# Patient Record
Sex: MALE | Race: BLACK OR AFRICAN AMERICAN | NOT HISPANIC OR LATINO | Employment: FULL TIME | URBAN - METROPOLITAN AREA
[De-identification: names, ages, dates, MRNs, and addresses within clinical notes are randomized per-mention and may not be internally consistent; named-entity substitution may affect disease eponyms.]

---

## 2017-08-28 DIAGNOSIS — M54.2 NECK PAIN: Primary | ICD-10-CM

## 2017-08-28 DIAGNOSIS — M54.9 PAIN IN BACK: ICD-10-CM

## 2017-09-05 ENCOUNTER — CLINICAL SUPPORT (OUTPATIENT)
Dept: REHABILITATION | Facility: HOSPITAL | Age: 37
End: 2017-09-05
Attending: INTERNAL MEDICINE
Payer: COMMERCIAL

## 2017-09-05 DIAGNOSIS — Z74.09 DECREASED STRENGTH, ENDURANCE, AND MOBILITY: ICD-10-CM

## 2017-09-05 DIAGNOSIS — R53.1 DECREASED STRENGTH, ENDURANCE, AND MOBILITY: ICD-10-CM

## 2017-09-05 DIAGNOSIS — R68.89 DECREASED STRENGTH, ENDURANCE, AND MOBILITY: ICD-10-CM

## 2017-09-05 PROCEDURE — G8978 MOBILITY CURRENT STATUS: HCPCS | Mod: CK,PO

## 2017-09-05 PROCEDURE — 97161 PT EVAL LOW COMPLEX 20 MIN: CPT | Mod: PO

## 2017-09-05 PROCEDURE — G8979 MOBILITY GOAL STATUS: HCPCS | Mod: CI,PO

## 2017-09-05 NOTE — PROGRESS NOTES
Physical Therapy Initial Evaluation     Name: Denny Sullivan  [facility] Number: [id number]    Diagnosis:   Encounter Diagnosis   Name Primary?    Decreased strength, endurance, and mobility      Physician: Rosalinda Rodriguez MD  Treatment Orders: PT Eval and Treat  Past Medical History:   Diagnosis Date    Hypertension     Metabolic syndrome     Pre-Diabetic    Migraine headache     Seizure      Current Outpatient Prescriptions   Medication Sig    amlodipine (NORVASC) 10 MG tablet Take 1 tablet (10 mg total) by mouth once daily.    carvedilol (COREG) 12.5 MG tablet Take 1 tablet (12.5 mg total) by mouth 2 (two) times daily.    dicyclomine (BENTYL) 20 mg tablet Take 20 mg by mouth every 6 (six) hours.    liraglutide 0.6 mg/0.1 mL (18 mg/3 mL) PnIj Inject 1.8 mg into the skin before breakfast.    metformin (GLUCOPHAGE) 500 MG tablet     omeprazole (PRILOSEC) 40 MG capsule Take 1 capsule (40 mg total) by mouth once daily.    rizatriptan (MAXALT) 10 MG tablet Take 10 mg by mouth as needed.    rosuvastatin (CRESTOR) 10 MG tablet Take 10 mg by mouth once daily.    topiramate (TOPAMAX) 50 MG tablet Take 50 mg by mouth 2 (two) times daily.     No current facility-administered medications for this visit.      Review of patient's allergies indicates:   Allergen Reactions    Toradol [ketorolac] Other (See Comments)     AIN-severe with ARF    Nsaids (non-steroidal anti-inflammatory drug) Other (See Comments)     AIN-severe       Evaluation Date: 9/5/17  Visit # authorized: 20  Authorization period:   Plan of care Expiration: 12/31/17  MD referral: y      Subjective     Patient states:  Reports that he began having pain for a little over a month and reports no significant change in activity prior to the onset of pain.  The pt denies any hx of neck or back pain. The pt reports he did have an xray that showed disc herniation and arthritic changes in his cervical  spine. The pt reports he has had shooting pain in between his shoulder blades for quite some time but did not connect it to his neck.  The recent pain has been throbbing in nature and he does have periods where he gets relief.  The pt reports that he has been feeling a little better as of late and notes he has not had to take his medication as often.  The pt reports that he is allergic to NSAIDS.  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  The pt reports that he does get headaches but he does not feel they are related to the neck pain as they begin in his head as opposed to tension in his neck.  Sleeping in the wrong position increases his pain.  He typically sleeps on his side or stomach but has been having a lot of pain recently with sleeping on his stomach (throbbing pain).  The pt reports that the only thing that decreases his pain is the medication (muscle relaxer and he thinks a pain reliever). The pt's wife had their first baby 6 weeks ago and the pt reports that the baby does not sleep well.  She wakes every 2-3 hours to nurse or take a bottle and he has been getting up to help feed her.    Pain Scale: Denny rates pain on a scale of 0-10 to be 8 at worst; 3 currently; 1 at best .  Onset: insidious  Radicular symptoms:  Shooting in between shoulder blades   Aggravating factors:   Sleeping on his stomach   Easing factors:  Medication   Prior Therapy: none   Functional Deficits Leading to Referral: pain and decreased ability to perform work duties   Prior functional status: Independent without pain   DME owned/used:   Occupation:  Banker                        Pts goals:  To reduce pain     Objective     Posture Alignment: slouched posture    CERVICAL SPINE AROM:   Flexion: 35   Extension: 37   Left Sidebend: 20   Right Sidebend: 15    Left Rotation: 35   Right Rotation: 33     SEGMENTAL MOBILITY: hypomobile mid cervical, subcranial and mid  thoracic spine      UPPER EXTREMITY STRENGTH:   Left Right   Shoulder Flexion 4/5 4/5   Shoulder Abduction 4/5 4/5     Shoulder Internal Rotation 4/5 4/5   Shoulder External Rotation 4/5 4/5   Rhomboids  3/5 3/5   Low Traps  3/5 3/5   Mid Traps  3/5 3/5       DTR's:   Left Right   Biceps Tendon 2+ 2+   Brachioradialis 2+ 2+   Triceps Tendon 2+ 2+     Dermatomes: Sensation: Light Touch: Intact  Myotomes: WNL  Palpation: WNL     Special Tests:All Negative      Compression   Dystraction   Spurling   Alar Ligament   Transverse Ligament   Travis Fracture     Pt/family was provided educational information, including: role of PT, goals for PT, scheduling - pt verbalized understanding. Discussed insurance limitations with pt.     TREATMENT     Time In: 3:30 pm   Time Out: 4:30 pm     PT Evaluation Completed? Yes  Discussed Plan of Care with patient: Yes    Written Home Exercises Provided: deep neck flexor isometrics in supine, scapular retraction, upper trap stretch B, serratus punch in supne   Denny demo good understanding of the education provided. Patient demo good return demo of skill of exercises.    Assessment     Patient presents today with decreased AROM, decreased spinal mobility, decreased postural endurance and awareness, pain and decreased function.    Pt prognosis is Good.  Pt will benefit from skilled outpatient physical therapy to address the above stated deficits, provide pt/family education and to maximize pt's level of independence.     History  Co-morbidities and personal factors that may impact the plan of care Examination  Body Structures and Functions, activity limitations and participation restrictions that may impact the plan of care    Clinical Presentation   Co-morbidities:   high BMI, hypertension, acute renal failure, metabolic syndrome         Personal Factors:   lifestyle Body Regions:   neck  back  upper extremities    Body Systems:   ROM  strength  joint mobility        Participation  Restrictions:   Limits the patient with work responsibilities      Activity limitations:   Learning and applying knowledge  no deficits    General Tasks and Commands  no deficits    Communication  no deficits    Mobility  lifting and carrying objects    Self care  sleeping     Domestic Life  no deficits    Interactions/Relationships  no deficits    Life Areas  higher education    Community and Social Life  no deficits         stable and uncomplicated                      low   moderate  moderate Decision Making/ Complexity Score:  low           Pt's spiritual, cultural and educational needs considered and pt agreeable to plan of care and goals as stated below:     Anticipated Barriers for physical therapy: none     Short Term GOALS: 4 weeks. Pt agrees with goals set.  1. Patient demonstrates independence with HEP.   2. Patient demonstrates independence with Postural Awareness.   3. Patient demonstrates independence with body mechanics.     Long Term GOALS: 8 weeks. Pt agrees with goals set.  1. Patient demonstrates increased ROM and spinal mobility  to improve tolerance to functional activities pain free.   2. Patient demonstrates increased strength BUE's to 4+/5 or greater to improve tolerance to functional activities pain free.   3. Patient demonstrates improved overall function per FOTO Neck Survey to 15% Limitation or less.     Functional Limitations Reports - G Codes  Category: Mobility  Tool: FOTO Neck Survey  Score: 45% Limitation   Modifier  Impairment Limitation Restriction    CH  0 % impaired, limited or restricted    CI  @ least 1% but less than 20% impaired, limited or restricted    CJ  @ least 20%<40% impaired, limited or restricted    CK  @ least 40%<60% impaired, limited or restricted    CL  @ least 60% <80% impaired, limited or restricted    CM  @ least 80%<100% impaired limited or restricted    CN  100% impaired, limited or restricted     Current/ 45% limitation  Goal/ : 15%  limitation    PLAN     Outpatient physical therapy 1-2 times weekly to include: pt ed, hep, therapeutic exercises, neuromuscular re-education/ balance exercises, joint mobilizations, aquatic therapy and modalities prn. Cont PT for  8 weeks. Pt may be seen by PTA as part of the rehabilitation team.     Therapist: Radha Ellis, PT  9/5/2017

## 2017-09-06 ENCOUNTER — HOSPITAL ENCOUNTER (EMERGENCY)
Facility: HOSPITAL | Age: 37
Discharge: HOME | End: 2017-09-06
Attending: EMERGENCY MEDICINE
Payer: COMMERCIAL

## 2017-09-06 VITALS
RESPIRATION RATE: 18 BRPM | TEMPERATURE: 98 F | HEART RATE: 82 BPM | BODY MASS INDEX: 42.66 KG/M2 | HEIGHT: 72 IN | SYSTOLIC BLOOD PRESSURE: 163 MMHG | DIASTOLIC BLOOD PRESSURE: 110 MMHG | OXYGEN SATURATION: 97 % | WEIGHT: 315 LBS

## 2017-09-06 DIAGNOSIS — K64.4 EXTERNAL HEMORRHOID, BLEEDING: Primary | ICD-10-CM

## 2017-09-06 PROCEDURE — 99283 EMERGENCY DEPT VISIT LOW MDM: CPT

## 2017-09-06 PROCEDURE — 99284 EMERGENCY DEPT VISIT MOD MDM: CPT | Mod: ,,, | Performed by: PHYSICIAN ASSISTANT

## 2017-09-06 RX ORDER — CYCLOBENZAPRINE HCL 10 MG
10 TABLET ORAL 3 TIMES DAILY PRN
COMMUNITY
End: 2017-11-15

## 2017-09-06 RX ORDER — LEVOCETIRIZINE DIHYDROCHLORIDE 5 MG/1
5 TABLET, FILM COATED ORAL NIGHTLY
COMMUNITY
End: 2018-02-15 | Stop reason: SDUPTHER

## 2017-09-06 RX ORDER — HYDROCORTISONE ACETATE PRAMOXINE HCL 2.5; 1 G/100G; G/100G
CREAM TOPICAL 3 TIMES DAILY
Qty: 1 TUBE | Refills: 0 | Status: SHIPPED | OUTPATIENT
Start: 2017-09-06 | End: 2017-12-11 | Stop reason: ALTCHOICE

## 2017-09-06 NOTE — PLAN OF CARE
Physical Therapy Initial Evaluation     Name: Denny Sullivan  [facility] Number: [id number]    Diagnosis:   Encounter Diagnosis   Name Primary?    Decreased strength, endurance, and mobility      Physician: Rosalinda Rodriguez MD  Treatment Orders: PT Eval and Treat  Past Medical History:   Diagnosis Date    Hypertension     Metabolic syndrome     Pre-Diabetic    Migraine headache     Seizure      Current Outpatient Prescriptions   Medication Sig    amlodipine (NORVASC) 10 MG tablet Take 1 tablet (10 mg total) by mouth once daily.    carvedilol (COREG) 12.5 MG tablet Take 1 tablet (12.5 mg total) by mouth 2 (two) times daily.    dicyclomine (BENTYL) 20 mg tablet Take 20 mg by mouth every 6 (six) hours.    liraglutide 0.6 mg/0.1 mL (18 mg/3 mL) PnIj Inject 1.8 mg into the skin before breakfast.    metformin (GLUCOPHAGE) 500 MG tablet     omeprazole (PRILOSEC) 40 MG capsule Take 1 capsule (40 mg total) by mouth once daily.    rizatriptan (MAXALT) 10 MG tablet Take 10 mg by mouth as needed.    rosuvastatin (CRESTOR) 10 MG tablet Take 10 mg by mouth once daily.    topiramate (TOPAMAX) 50 MG tablet Take 50 mg by mouth 2 (two) times daily.     No current facility-administered medications for this visit.      Review of patient's allergies indicates:   Allergen Reactions    Toradol [ketorolac] Other (See Comments)     AIN-severe with ARF    Nsaids (non-steroidal anti-inflammatory drug) Other (See Comments)     AIN-severe       Evaluation Date: 9/5/17  Visit # authorized: 20  Authorization period:   Plan of care Expiration: 12/31/17  MD referral: y      Subjective     Patient states:  Reports that he began having pain for a little over a month and reports no significant change in activity prior to the onset of pain.  The pt denies any hx of neck or back pain. The pt reports he did have an xray that showed disc herniation and arthritic changes in his cervical spine.  The pt reports he has had shooting pain in between his shoulder blades for quite some time but did not connect it to his neck.  The recent pain has been throbbing in nature and he does have periods where he gets relief.  The pt reports that he has been feeling a little better as of late and notes he has not had to take his medication as often.  The pt reports that he is allergic to NSAIDS.  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  The pt reports that he does get headaches but he does not feel they are related to the neck pain as they begin in his head as opposed to tension in his neck.  Sleeping in the wrong position increases his pain.  He typically sleeps on his side or stomach but has been having a lot of pain recently with sleeping on his stomach (throbbing pain).  The pt reports that the only thing that decreases his pain is the medication (muscle relaxer and he thinks a pain reliever). The pt's wife had their first baby 6 weeks ago and the pt reports that the baby does not sleep well.  She wakes every 2-3 hours to nurse or take a bottle and he has been getting up to help feed her.    Pain Scale: Denny rates pain on a scale of 0-10 to be 8 at worst; 3 currently; 1 at best .  Onset: insidious  Radicular symptoms:  Shooting in between shoulder blades   Aggravating factors:   Sleeping on his stomach   Easing factors:  Medication   Prior Therapy: none   Functional Deficits Leading to Referral: pain and decreased ability to perform work duties   Prior functional status: Independent without pain   DME owned/used:   Occupation:  Banker                        Pts goals:  To reduce pain     Objective     Posture Alignment: slouched posture    CERVICAL SPINE AROM:   Flexion: 35   Extension: 37   Left Sidebend: 20   Right Sidebend: 15    Left Rotation: 35   Right Rotation: 33     SEGMENTAL MOBILITY: hypomobile mid cervical, subcranial and mid thoracic  spine      UPPER EXTREMITY STRENGTH:   Left Right   Shoulder Flexion 4/5 4/5   Shoulder Abduction 4/5 4/5     Shoulder Internal Rotation 4/5 4/5   Shoulder External Rotation 4/5 4/5   Rhomboids  3/5 3/5   Low Traps  3/5 3/5   Mid Traps  3/5 3/5       DTR's:   Left Right   Biceps Tendon 2+ 2+   Brachioradialis 2+ 2+   Triceps Tendon 2+ 2+     Dermatomes: Sensation: Light Touch: Intact  Myotomes: WNL  Palpation: WNL     Special Tests:All Negative      Compression   Dystraction   Spurling   Alar Ligament   Transverse Ligament   Travis Fracture     Pt/family was provided educational information, including: role of PT, goals for PT, scheduling - pt verbalized understanding. Discussed insurance limitations with pt.     TREATMENT     Time In: 3:30 pm   Time Out: 4:30 pm     PT Evaluation Completed? Yes  Discussed Plan of Care with patient: Yes    Written Home Exercises Provided: deep neck flexor isometrics in supine, scapular retraction, upper trap stretch B, serratus punch in supne   Denny demo good understanding of the education provided. Patient demo good return demo of skill of exercises.    Assessment     Patient presents today with decreased AROM, decreased spinal mobility, decreased postural endurance and awareness, pain and decreased function.    Pt prognosis is Good.  Pt will benefit from skilled outpatient physical therapy to address the above stated deficits, provide pt/family education and to maximize pt's level of independence.     History  Co-morbidities and personal factors that may impact the plan of care Examination  Body Structures and Functions, activity limitations and participation restrictions that may impact the plan of care    Clinical Presentation   Co-morbidities:   high BMI, hypertension, acute renal failure, metabolic syndrome         Personal Factors:   lifestyle Body Regions:   neck  back  upper extremities    Body Systems:   ROM  strength  joint mobility        Participation  Restrictions:   Limits the patient with work responsibilities      Activity limitations:   Learning and applying knowledge  no deficits    General Tasks and Commands  no deficits    Communication  no deficits    Mobility  lifting and carrying objects    Self care  sleeping     Domestic Life  no deficits    Interactions/Relationships  no deficits    Life Areas  higher education    Community and Social Life  no deficits         stable and uncomplicated                      low   moderate  moderate Decision Making/ Complexity Score:  low           Pt's spiritual, cultural and educational needs considered and pt agreeable to plan of care and goals as stated below:     Anticipated Barriers for physical therapy: none     Short Term GOALS: 4 weeks. Pt agrees with goals set.  1. Patient demonstrates independence with HEP.   2. Patient demonstrates independence with Postural Awareness.   3. Patient demonstrates independence with body mechanics.     Long Term GOALS: 8 weeks. Pt agrees with goals set.  1. Patient demonstrates increased ROM and spinal mobility  to improve tolerance to functional activities pain free.   2. Patient demonstrates increased strength BUE's to 4+/5 or greater to improve tolerance to functional activities pain free.   3. Patient demonstrates improved overall function per FOTO Neck Survey to 15% Limitation or less.     Functional Limitations Reports - G Codes  Category: Mobility  Tool: FOTO Neck Survey  Score: 45% Limitation   Modifier  Impairment Limitation Restriction    CH  0 % impaired, limited or restricted    CI  @ least 1% but less than 20% impaired, limited or restricted    CJ  @ least 20%<40% impaired, limited or restricted    CK  @ least 40%<60% impaired, limited or restricted    CL  @ least 60% <80% impaired, limited or restricted    CM  @ least 80%<100% impaired limited or restricted    CN  100% impaired, limited or restricted     Current/ 45% limitation  Goal/ : 15%  limitation    PLAN     Outpatient physical therapy 1-2 times weekly to include: pt ed, hep, therapeutic exercises, neuromuscular re-education/ balance exercises, joint mobilizations, aquatic therapy and modalities prn. Cont PT for  8 weeks. Pt may be seen by PTA as part of the rehabilitation team.     Therapist: Radha Ellis, PT  9/5/2017

## 2017-09-07 NOTE — ED PROVIDER NOTES
"Encounter Date: 9/6/2017    SCRIBE #1 NOTE: I, Aly Heath, am scribing for, and in the presence of,  Dr. Méndez. I have scribed the following portions of the note - the APC attestation.       History     Chief Complaint   Patient presents with    Rectal Bleeding     denies pain . Thinks a hemorrhoid popped.  [place] like he could feel a Cyst in rectal area and since bleeding the cyst is no longer rthere.      Patient is a 36-year-old male with a past medical history of HTN and gastritis who presents the ED with rectal leading.  Patient states that 3 PM he noticed blood from his rectum.  He denies any pain.  He has a history of hemorrhoids but has not had any bleeding in "many decades".  Patient states that his hemorrhoid is now "flat" and "not there now" since noticing the bleeding.  His bowel movements are regular and he has not noticed any blood with his stools.  He denies any personal or family history of colon cancer.  He denies any blood thinners or anticoagulant use.  He has no other complaints at this time.          Review of patient's allergies indicates:   Allergen Reactions    Toradol [ketorolac] Other (See Comments)     AIN-severe with ARF    Nsaids (non-steroidal anti-inflammatory drug) Other (See Comments)     AIN-severe     Past Medical History:   Diagnosis Date    Hypertension     Metabolic syndrome     Pre-Diabetic    Migraine headache     Seizure      Past Surgical History:   Procedure Laterality Date    LUNG SURGERY       Family History   Problem Relation Age of Onset    Hypertension Mother     Diabetes Mother     Diabetes Father     Hypertension Father     Asthma Brother      Social History   Substance Use Topics    Smoking status: Never Smoker    Smokeless tobacco: Not on file    Alcohol use Yes      Comment: Occasional wine, beer, and liqor     Review of Systems   Constitutional: Negative for chills and fever.   HENT: Negative for nosebleeds and sore throat.    Eyes: Negative for " visual disturbance.   Respiratory: Negative for shortness of breath.    Cardiovascular: Negative for chest pain.   Gastrointestinal: Positive for anal bleeding. Negative for abdominal pain, constipation, nausea and rectal pain.   Endocrine: Negative for polyuria.   Genitourinary: Negative for dysuria and hematuria.   Musculoskeletal: Negative for back pain.   Skin: Negative for rash.   Neurological: Negative for dizziness, weakness and light-headedness.   Hematological: Does not bruise/bleed easily.       Physical Exam     Initial Vitals [09/06/17 1721]   BP Pulse Resp Temp SpO2   (!) 175/106 89 18 98.4 °F (36.9 °C) 97 %      MAP       129         Physical Exam    Nursing note and vitals reviewed.  Constitutional: He appears well-developed and well-nourished. He is not diaphoretic.   Healthy-appearing obese male in NAD and nontoxic appearing.   HENT:   Head: Normocephalic and atraumatic.   Nose: Nose normal.   Eyes: Conjunctivae and EOM are normal.   Neck: Normal range of motion.   Cardiovascular: Normal rate, regular rhythm and normal heart sounds. Exam reveals no friction rub.    No murmur heard.  Pulmonary/Chest: Breath sounds normal. No respiratory distress. He has no wheezes. He has no rales.   Abdominal: Soft. Bowel sounds are normal. He exhibits no distension. There is no tenderness.   Genitourinary: Rectal exam shows external hemorrhoid (large, bleeding at 12 o'clock). Rectal exam shows no fissure.   Musculoskeletal: Normal range of motion.   Neurological: He is alert and oriented to person, place, and time. He has normal strength. No sensory deficit.   Skin: Skin is warm and dry. No erythema.   Psychiatric: He has a normal mood and affect. Thought content normal.         ED Course   Procedures  Labs Reviewed - No data to display          Medical Decision Making:   History:   Old Medical Records: I decided to obtain old medical records.       APC / Resident Notes:   On exam, hemodynamically stable.  NAD and  nontoxic appearing.  He has a large and bleeding hemorrhoid at 12 o'clock.  Mild tenderness to palpation. I have considered but do not suspect fissure, fistula, abscess, or strangulated hemorrhoid. He only reports bleeding today; I do not suspect that patient is anemic. He denies any other symptoms.    I will treat patient for external hemorrhoid. High fiber diet, stool softener, sitz bath, and analpram cram. F/u with CRS. Patient is comfortable and stable for d/c.         Scribe Attestation:   Scribe #1: I performed the above scribed service and the documentation accurately describes the services I performed. I attest to the accuracy of the note.    Attending Attestation:     Physician Attestation Statement for NP/PA:   I discussed this assessment and plan of this patient with the NP/PA, but I did not personally examine the patient. The face to face encounter was performed by the NP/PA.        Physician Attestation for Scribe:  Physician Attestation Statement for Scribe #1: I, Dr. Méndez, reviewed documentation, as scribed by Aly Heath in my presence, and it is both accurate and complete.                 ED Course      Clinical Impression:   The encounter diagnosis was External hemorrhoid, bleeding.    Disposition:   Disposition: Discharged  Condition: Stable                        Claire Gibbs PA-C  09/08/17 1740

## 2017-09-07 NOTE — ED NOTES
Patient noticed some bleeding from his rectum while at work. Patient thinks it may cyst or hemorrhoids. Has a history of hemmorhoids.  Patient denies blood in stool.

## 2017-09-07 NOTE — DISCHARGE INSTRUCTIONS
Use Analpram cream three times a day.  Do warm sitz bath (warm water with salt) for 20 minutes 3 times a day.  If you are constipated or have hard stools, consider high fiber diet and stool softeners (Colace).  Follow up with colon and rectal services in 3 days or as soon as possible.    Future Appointments  Date Time Provider Department Center   9/12/2017 1:30 PM Radha Ellis, PT [facility]   9/18/2017 3:00 PM [facility]   9/28/2017 3:00 PM [facility]   10/3/2017 2:30 PM Radha Ellis, PT [facility]       Our goal in the emergency department is to always give you outstanding care and exceptional service. You may receive a survey by mail or e-mail in the next week regarding your experience in our ED. We would greatly appreciate your completing and returning the survey. Your feedback provides us with a way to recognize our staff who give very good care and it helps us learn how to improve when your experience was below our aspiration of excellence.

## 2017-09-07 NOTE — ED NOTES
Pt identifiers Denny Sullivan checked and correct  LOC: The patient is awake, alert, aware of environment with an appropriate affect. Oriented x3, speaking appropriately  APPEARANCE: Pt resting comfortably, in no acute distress, pt is clean and well groomed, clothing properly fastened  SKIN: Skin warm, dry and intact, normal skin turgor, moist mucus membranes  RESPIRATORY: Airway is open and patent, respirations are spontaneous, even and unlabored, normal effort and rate  CARDIAC: Normal rate and rhythm, no peripheral edema noted, capillary refill < 3 seconds, bilateral radial pulses 2+  ABDOMEN: Soft, nontender, nondistended. Bowel sounds present. +external hemorrhoid present draining dark red blood.   NEUROLOGIC: PERRL, facial expression is symmetrical, patient moving all extremities spontaneously, normal sensation in all extremities when touched with a finger.  Follows all commands appropriately  MUSCULOSKELETAL: No obvious deformities.

## 2017-09-12 ENCOUNTER — CLINICAL SUPPORT (OUTPATIENT)
Dept: REHABILITATION | Facility: HOSPITAL | Age: 37
End: 2017-09-12
Attending: INTERNAL MEDICINE
Payer: COMMERCIAL

## 2017-09-12 DIAGNOSIS — R68.89 DECREASED STRENGTH, ENDURANCE, AND MOBILITY: ICD-10-CM

## 2017-09-12 DIAGNOSIS — Z74.09 DECREASED STRENGTH, ENDURANCE, AND MOBILITY: ICD-10-CM

## 2017-09-12 DIAGNOSIS — R53.1 DECREASED STRENGTH, ENDURANCE, AND MOBILITY: ICD-10-CM

## 2017-09-12 PROCEDURE — 97110 THERAPEUTIC EXERCISES: CPT | Mod: PO

## 2017-09-12 NOTE — PROGRESS NOTES
"                                                    Physical Therapy Daily Note     Name: Denny Sullivan  [facility] Number: [id number]  Diagnosis:   Encounter Diagnosis   Name Primary?    Decreased strength, endurance, and mobility      Physician: Rosalinda Rodriguez MD  Precautions: None   Visit #: 2 of 12  PTA Visit #: 0  Time In: 1:45 pm (Pt 15 mins late)  Time Out: 2:20 pm   Total Treatment Time 1:1: 25    Evaluation Date: 9/6/27  Visit # authorized: 20  Authorization period: 12/31/17  Plan of care Expiration: 11/6/17   MD referral: y    Subjective     Pt reports: compliance with his HEP and notes no pain with the exercises.  He does note he has been more cognizant of the position of his computer at work and how he holds his baby at home.  He feels these changes have helped some.   Pain Scale: Denny rates pain on a scale of 0-10 to be 4 currently.    Objective     Denny received individual therapeutic exercises to develop strength, endurance, posture and core stabilization for 35 minutes including:  UBE 3 min backward   Breugger's standing LOTB 2 x 10 3 sec hold   Standing serratus punch LOTB 20x 3 sec hold   Wall facing DNF with "w" liftoff 20 x 3 sec hold   DNF with sarhman "w" stretch 10 x 10 sec       Written Home Exercises Provided: no new exercises added   Pt demo good understanding of the education provided. Denny demonstrated good return demonstration of activities.     Education provided re: no new   Denny verbalized good understanding of education provided.   No spiritual or educational barriers to learning provided    Assessment     Patient tolerated limited postural and core stabilization exercises well but demos significantly decreased endurance of these muscle groups.  The pt does demo improved postural awareness following these exercises. Will begin manual therapy to include joint and soft tissue mobilization next session as the pt had to get to a meeting today.    This is a 36 y.o. male " referred to outpatient physical therapy and presents with a medical diagnosis of neck and mid back pain  and demonstrates limitations as described in the problem list. Pt prognosis is Good. Pt will continue to benefit from skilled outpatient physical therapy to address the deficits listed in the problem list, provide pt/family education and to maximize pt's level of independence in the home and community environment.      Plan     Continue with established Plan of Care towards PT goals.    Therapist: Radha Ellis, PT  9/12/2017

## 2017-09-18 ENCOUNTER — CLINICAL SUPPORT (OUTPATIENT)
Dept: REHABILITATION | Facility: HOSPITAL | Age: 37
End: 2017-09-18
Attending: INTERNAL MEDICINE
Payer: COMMERCIAL

## 2017-09-18 DIAGNOSIS — R68.89 DECREASED STRENGTH, ENDURANCE, AND MOBILITY: ICD-10-CM

## 2017-09-18 DIAGNOSIS — Z74.09 DECREASED STRENGTH, ENDURANCE, AND MOBILITY: ICD-10-CM

## 2017-09-18 DIAGNOSIS — R53.1 DECREASED STRENGTH, ENDURANCE, AND MOBILITY: ICD-10-CM

## 2017-09-18 PROCEDURE — 97140 MANUAL THERAPY 1/> REGIONS: CPT | Mod: PO

## 2017-09-18 NOTE — PROGRESS NOTES
"                                                    Physical Therapy Daily Note     Name: Denny Sullivan  [facility] Number: [id number]  Diagnosis:   Encounter Diagnosis   Name Primary?    Decreased strength, endurance, and mobility      Physician: Rosalinda Rodriguez MD  Precautions: None   Visit #: 3 of 12  PTA Visit #: 0  Time In: 3:20 pm (Pt 15 mins late)  Time Out: 4:00 pm   Total Treatment Time 1:1: 15    Evaluation Date: 9/6/27  Visit # authorized: 20  Authorization period: 12/31/17  Plan of care Expiration: 11/6/17   MD referral: y    Subjective     Pt reports: The pt reports that he does feel somewhat improved. He still notes increased pain after sitting for prolonged periods but is more aware of his posture.   Pain Scale: Denny rates pain on a scale of 0-10 to be 4 currently.    Objective     Denny received individual therapeutic exercises to develop strength, endurance, posture and core stabilization for 30 minutes including:  UBE 3 min backward   Breugger's standing LOTB 2 x 10 3 sec hold   Standing serratus punch LOTB 20x 3 sec hold   Wall facing DNF with "w" liftoff 20 x 3 sec hold   DNF with sarhman "w" stretch 10 x 10 sec       Denny received upper trap and cervical paraspinal STM, manual upper trap and levator stretching grade I axial flexion for pain modulation and to reduce muscle guarding and tension x 15 mins     Written Home Exercises Provided: no new exercises added   Pt demo good understanding of the education provided. Denny demonstrated good return demonstration of activities.     Education provided re: no new   Denny verbalized good understanding of education provided.   No spiritual or educational barriers to learning provided    Assessment     The pt still demos decreased endurance of cervical paraspinals and scapular stabilizers which lead to decreased postural awareness and endurance. However, the pt required less rest breaks and tactile cuing today that he did at the last " session.  The pt also with increased guarding of cervical paraspinals and upper traps, but does demo slight reduction following manual therapy. Will continue to increase postural ex as tolerated.  This is a 36 y.o. male referred to outpatient physical therapy and presents with a medical diagnosis of neck and mid back pain  and demonstrates limitations as described in the problem list. Pt prognosis is Good. Pt will continue to benefit from skilled outpatient physical therapy to address the deficits listed in the problem list, provide pt/family education and to maximize pt's level of independence in the home and community environment.      Plan     Continue with established Plan of Care towards PT goals.    Therapist: Radha Ellis, PT  9/18/2017

## 2017-10-03 ENCOUNTER — CLINICAL SUPPORT (OUTPATIENT)
Dept: REHABILITATION | Facility: HOSPITAL | Age: 37
End: 2017-10-03
Attending: INTERNAL MEDICINE
Payer: COMMERCIAL

## 2017-10-03 DIAGNOSIS — Z74.09 DECREASED STRENGTH, ENDURANCE, AND MOBILITY: ICD-10-CM

## 2017-10-03 DIAGNOSIS — R53.1 DECREASED STRENGTH, ENDURANCE, AND MOBILITY: ICD-10-CM

## 2017-10-03 DIAGNOSIS — R68.89 DECREASED STRENGTH, ENDURANCE, AND MOBILITY: ICD-10-CM

## 2017-10-03 PROCEDURE — 97140 MANUAL THERAPY 1/> REGIONS: CPT | Mod: PO

## 2017-10-03 PROCEDURE — 97110 THERAPEUTIC EXERCISES: CPT | Mod: PO

## 2017-10-03 NOTE — PROGRESS NOTES
"                                                    Physical Therapy Daily Note     Name: Denny Sullivan  [facility] Number: [id number]  Diagnosis:   Encounter Diagnosis   Name Primary?    Decreased strength, endurance, and mobility      Physician: Rosalinda Rodriguez MD  Precautions: None   Visit #: 4 of 12  PTA Visit #: 0  Time In: 2:40 pm  Time Out: 3:30 pm   Total Treatment Time 1:1: 30    Evaluation Date: 9/6/27  Visit # authorized: 20  Authorization period: 12/31/17  Plan of care Expiration: 11/6/17   MD referral: y    Subjective     Pt reports: The pt reports that he feels like the exercises are helping to improve his pain.   Pain Scale: Denny rates pain on a scale of 0-10 to be 2 currently.    Objective     Denny received individual therapeutic exercises to develop strength, endurance, posture and core stabilization for 30 minutes including:  UBE 6 min backward   Breugger's standing LOTB 2 x 10 3 sec hold   Standing serratus punch LOTB 20x 3 sec hold   Wall facing DNF with "w" liftoff 20 x 3 sec hold   DNF with sarhman "w" stretch 10 x 10 sec hold  Cervical isometrics chin tuck with towel roll 10 x 5 sec hold  Row and extension with GTB 2 x 10    Denny received upper trap and cervical paraspinal STM, manual upper trap and levator stretching grade I axial flexion for pain modulation and to reduce muscle guarding and tension x 15 mins     Written Home Exercises Provided: no new exercises added   Pt demo good understanding of the education provided. Denny demonstrated good return demonstration of activities.     Education provided re: no new   Denny verbalized good understanding of education provided.   No spiritual or educational barriers to learning provided    Assessment     The pt demos mild improvement in postural awareness and endurance.  The pt also with improved activation of scapular stabilizers with exercises and required less frequent tactile cuing. Will continue to increase postural ex as " tolerated.  This is a 36 y.o. male referred to outpatient physical therapy and presents with a medical diagnosis of neck and mid back pain  and demonstrates limitations as described in the problem list. Pt prognosis is Good. Pt will continue to benefit from skilled outpatient physical therapy to address the deficits listed in the problem list, provide pt/family education and to maximize pt's level of independence in the home and community environment.      Plan     Continue with established Plan of Care towards PT goals.    Therapist: Radha Ellis, PT  10/3/2017

## 2017-10-04 ENCOUNTER — OFFICE VISIT (OUTPATIENT)
Dept: SURGERY | Facility: CLINIC | Age: 37
End: 2017-10-04
Payer: COMMERCIAL

## 2017-10-04 VITALS
HEART RATE: 72 BPM | BODY MASS INDEX: 42.66 KG/M2 | SYSTOLIC BLOOD PRESSURE: 172 MMHG | WEIGHT: 315 LBS | DIASTOLIC BLOOD PRESSURE: 97 MMHG | HEIGHT: 72 IN

## 2017-10-04 DIAGNOSIS — K64.5 THROMBOSED EXTERNAL HEMORRHOIDS: Primary | ICD-10-CM

## 2017-10-04 PROCEDURE — 99999 PR PBB SHADOW E&M-EST. PATIENT-LVL III: CPT | Mod: PBBFAC,,, | Performed by: COLON & RECTAL SURGERY

## 2017-10-04 PROCEDURE — 46600 DIAGNOSTIC ANOSCOPY SPX: CPT | Mod: S$GLB,,, | Performed by: COLON & RECTAL SURGERY

## 2017-10-04 PROCEDURE — 99203 OFFICE O/P NEW LOW 30 MIN: CPT | Mod: 25,S$GLB,, | Performed by: COLON & RECTAL SURGERY

## 2017-10-04 RX ORDER — AMLODIPINE BESYLATE 10 MG/1
TABLET ORAL DAILY
COMMUNITY
Start: 2017-09-02 | End: 2017-12-11 | Stop reason: SDUPTHER

## 2017-10-04 RX ORDER — HYDROCORTISONE ACETATE, PRAMOXINE HCL 2.5; 1 G/100G; G/100G
CREAM TOPICAL
COMMUNITY
Start: 2017-09-11 | End: 2017-12-11

## 2017-10-04 RX ORDER — CYCLOBENZAPRINE HCL 5 MG
TABLET ORAL
COMMUNITY
Start: 2017-08-15 | End: 2017-11-15

## 2017-10-04 RX ORDER — MONTELUKAST SODIUM 10 MG/1
TABLET ORAL NIGHTLY
COMMUNITY
Start: 2017-09-09 | End: 2017-12-11 | Stop reason: SDUPTHER

## 2017-10-04 RX ORDER — CARVEDILOL 25 MG/1
TABLET ORAL 2 TIMES DAILY
COMMUNITY
Start: 2017-10-02 | End: 2017-12-11 | Stop reason: SDUPTHER

## 2017-10-04 RX ORDER — METFORMIN HYDROCHLORIDE 500 MG/1
TABLET, EXTENDED RELEASE ORAL
COMMUNITY
Start: 2017-10-02 | End: 2017-11-09 | Stop reason: SDUPTHER

## 2017-10-04 RX ORDER — TRAMADOL HYDROCHLORIDE 50 MG/1
TABLET ORAL
COMMUNITY
Start: 2017-08-21 | End: 2017-11-15

## 2017-10-04 NOTE — PROGRESS NOTES
Painless anal bleeding.  One day.  Spontaneous.  Stopped.  Went to ED 1 month ago and they saw hemorrhoid externally.    BMs daily, 1-2 per day.  Not straining.  No prolapse.  \  Much more swollen previously    Past Medical History:   Diagnosis Date    Hypertension     Metabolic syndrome     Pre-Diabetic    Migraine headache     Seizure      Past Surgical History:   Procedure Laterality Date    LUNG SURGERY       Review of patient's allergies indicates:   Allergen Reactions    Toradol [ketorolac] Other (See Comments)     AIN-severe with ARF    Nsaids (non-steroidal anti-inflammatory drug) Other (See Comments)     AIN-severe     Current Outpatient Prescriptions on File Prior to Visit   Medication Sig Dispense Refill    cyclobenzaprine (FLEXERIL) 10 MG tablet Take 10 mg by mouth 3 (three) times daily as needed for Muscle spasms.      dicyclomine (BENTYL) 20 mg tablet Take 20 mg by mouth every 6 (six) hours.      hydrocortisone-pramoxine (ANALPRAM-HC) 2.5-1 % Crea Place rectally 3 (three) times daily. 1 Tube 0    levocetirizine (XYZAL) 5 MG tablet Take 5 mg by mouth every evening.      liraglutide 0.6 mg/0.1 mL (18 mg/3 mL) PnIj Inject 1.8 mg into the skin before breakfast.      omeprazole (PRILOSEC) 40 MG capsule Take 1 capsule (40 mg total) by mouth once daily. 30 capsule 0    rizatriptan (MAXALT) 10 MG tablet Take 10 mg by mouth as needed.      rosuvastatin (CRESTOR) 10 MG tablet Take 10 mg by mouth once daily.      topiramate (TOPAMAX) 50 MG tablet Take 50 mg by mouth 2 (two) times daily.      [DISCONTINUED] metformin (GLUCOPHAGE) 500 MG tablet        No current facility-administered medications on file prior to visit.      Family History   Problem Relation Age of Onset    Hypertension Mother     Diabetes Mother     Diabetes Father     Hypertension Father     Asthma Brother      Social History     Social History    Marital status:      Spouse name: N/A    Number of children: N/A     Years of education: N/A     Occupational History    Not on file.     Social History Main Topics    Smoking status: Former Smoker     Types: Cigars     Quit date: 10/4/2016    Smokeless tobacco: Never Used    Alcohol use Yes      Comment: Occasional wine, beer, and liqor    Drug use: No    Sexual activity: Not on file     Other Topics Concern    Not on file     Social History Narrative    No narrative on file     ROS:  GENERAL: No fever, chills, fatigability or weight loss.  Integument:  No rashes, redness, icterus  CHEST: Denies ASKEW, cyanosis, wheezing, cough and sputum production.  CARDIOVASCULAR: Denies chest pain, PND, orthopnea or reduced exercise tolerance.  GI:  Denies abd pain, dysphagia, nausea, vomiting, no hematemesis, no rectal pain  : Denies burning on urination, no hematuria, no bacteriuria  MSK:  No deformities, swelling, joint pain swelling  Neurologic:  No HAs, seizures, weakness, paresthesias, gait problems    PE  Obese male in NAD  BP (!) 172/97 (BP Location: Left arm, Patient Position: Sitting, BP Method: Large (Automatic))   Pulse 72   Ht 6' (1.829 m)   Wt (!) 177 kg (390 lb 3.4 oz)   BMI 52.92 kg/m²   Rectal exam      Thrombosed ext hemorrhoid - non tender.    GLADIS nl tone, no internal mass.  Good squeeze    Assessment  Thrombosed external hemorrhoid, resolving    Recommend  High fiber diet - 25 grams per day.  Emphasis on whole grain breads such as double fiber wheat bread and high fiber cereals and bars.    Drink 8 glasses of water per day 64 - 96 oz per day  Fiber supplements such as KONSYL, METAMUCIL can be taken 1-2 x per day  Regular exercise - 30 min brisk walking 5 days per week  OV 6 weeks    Anoscopy Procedure Note:   Verbal consent obtained  Indications:  Anal bleeding  Post procedure diagnosis:  same  Procedure: anoscopy  Surgeon NEHEMIAS  Assistant: Kiarra  Specimen none  Findings:  See picture        Right posterior hemorrhoid grade grade 2  Right anterior hemorrhoid  grade grade 1  Left lateral hemorrhoid grade grade 2  Other findings:  Thrombosed external hemorrhoid  Pt tolerated procedure well.    No complications.

## 2017-10-10 ENCOUNTER — CLINICAL SUPPORT (OUTPATIENT)
Dept: REHABILITATION | Facility: HOSPITAL | Age: 37
End: 2017-10-10
Attending: INTERNAL MEDICINE
Payer: COMMERCIAL

## 2017-10-10 DIAGNOSIS — R53.1 DECREASED STRENGTH, ENDURANCE, AND MOBILITY: ICD-10-CM

## 2017-10-10 DIAGNOSIS — Z74.09 DECREASED STRENGTH, ENDURANCE, AND MOBILITY: ICD-10-CM

## 2017-10-10 DIAGNOSIS — R68.89 DECREASED STRENGTH, ENDURANCE, AND MOBILITY: ICD-10-CM

## 2017-10-10 PROCEDURE — 97110 THERAPEUTIC EXERCISES: CPT | Mod: PO

## 2017-10-10 NOTE — PROGRESS NOTES
"                                                    Physical Therapy Daily Note     Name: Denny Sullivan  Clinic Number: [id number]  Diagnosis:   Encounter Diagnosis   Name Primary?    Decreased strength, endurance, and mobility      Physician: Rosalinda Rodriguez MD  Precautions: None   Visit #: 5 of 12  PTA Visit #: 0  Time In: 3:55 pm (pt arrives 25 mins late)  Time Out: 4:30 pm   Total Treatment Time 1:1: 30    Evaluation Date: 9/6/27  Visit # authorized: 20  Authorization period: 12/31/17  Plan of care Expiration: 11/6/17   MD referral: y    Subjective     Pt reports: The pt reports that he feels like the exercises are helping to improve his pain.   Pain Scale: Denny rates pain on a scale of 0-10 to be 2 currently.    Objective     Denny received individual therapeutic exercises to develop strength, endurance, posture and core stabilization for 30 minutes including:  UBE 5 min backward   Breugger's standing OTB 2 x 10 3 sec hold   Standing serratus punch OTB 20x 3 sec hold   Wall facing DNF with "w" liftoff 20 x 3 sec hold   DNF with sarhman "w" stretch 10 x 10 sec hold  Cervical isometrics chin tuck with towel roll 20 x 5 sec hold  Row, and extension with GTB 2 x 10   Horizontal abduction with PTB 2 x 10    Written Home Exercises Provided: no new exercises added   Pt demo good understanding of the education provided. Denny demonstrated good return demonstration of activities.     Education provided re: no new   Denny verbalized good understanding of education provided.   No spiritual or educational barriers to learning provided    Assessment     The pt demos improved posture and scapular control during exercises requirning no cueing outside of initial instructions. Pt will be reassessed in 2 weeks to evaluate pt's status and determine further progression of treatment. This is a 36 y.o. male referred to outpatient physical therapy and presents with a medical diagnosis of neck and mid back pain  and " demonstrates limitations as described in the problem list. Pt prognosis is Good. Pt will continue to benefit from skilled outpatient physical therapy to address the deficits listed in the problem list, provide pt/family education and to maximize pt's level of independence in the home and community environment.      Plan     Continue with established Plan of Care towards PT goals.    Katelyn Marrero, SPT    I certify that I was present in the room directing the student in service delivery and guiding them using my skilled judgment. As the co-signing therapist I have reviewed the students documentation and am responsible for the treatment, assessment, and plan.     Therapist: Radha Ellis, PT  10/10/2017

## 2017-10-26 ENCOUNTER — CLINICAL SUPPORT (OUTPATIENT)
Dept: REHABILITATION | Facility: HOSPITAL | Age: 37
End: 2017-10-26
Attending: INTERNAL MEDICINE
Payer: COMMERCIAL

## 2017-10-26 DIAGNOSIS — R68.89 DECREASED STRENGTH, ENDURANCE, AND MOBILITY: ICD-10-CM

## 2017-10-26 DIAGNOSIS — Z74.09 DECREASED STRENGTH, ENDURANCE, AND MOBILITY: ICD-10-CM

## 2017-10-26 DIAGNOSIS — R53.1 DECREASED STRENGTH, ENDURANCE, AND MOBILITY: ICD-10-CM

## 2017-10-26 PROCEDURE — 97110 THERAPEUTIC EXERCISES: CPT | Mod: PO

## 2017-10-26 NOTE — PROGRESS NOTES
Physical Therapy Discharge Note     Name: Denny Sullivan  [facility] Number: [id number]  Diagnosis:   Encounter Diagnosis   Name Primary?    Decreased strength, endurance, and mobility      Physician: Rosalinda Rodriguez MD  Precautions: None   Visit #: 5 of 12  PTA Visit #: 0  Time In: 2:50  Time Out: 3:15  Total Treatment Time 1:1: 25    Evaluation Date: 9/6/27  Visit # authorized: 20  Authorization period: 12/31/17  Plan of care Expiration: 11/6/17   MD referral: y    Subjective     Pt reports: The pt reports that he feels great.   Pain Scale: Denny rates pain on a scale of 0-10 to be 0 currently.    Objective     Denny received individual therapeutic exercises to develop strength, endurance, posture and core stabilization including:  UBE 5 min backward     Written Home Exercises Provided: Denny was educated on his home exercise program for 20 minutes including   Breugger's standing GTB 2 x 10 3 sec hold   Standing serratus punch GTB 20x 3 sec hold   Cervical isometrics chin tuck with towel roll 20 x 5 sec hold  Row, and extension with GTB 2 x 10   Horizontal abduction with GTB 2 x 10    Pt demo good understanding of the education provided. Denny demonstrated good return demonstration of activities.     Education provided re: Pt educated to perform HEP 2-3 times per week and increase if he feels his symptoms are coming back. He was advised to call if he has any questions.  Denny verbalized good understanding of education provided.   No spiritual or educational barriers to learning provided    Assessment     The pt has met all ROM, strength and functional goals set by the PT and is appropriate for dc with HEP at this time. The pt demos full understanding of HEP. The pt demos improved posture and scapular control and has reported a significant decrease in pain to the point that he is currently pain free.    Plan     D/C with HEP.    Katelyn Marrero,  SPT    I certify that I was present in the room directing the student in service delivery and guiding them using my skilled judgment. As the co-signing therapist I have reviewed the students documentation and am responsible for the treatment, assessment, and plan.     Therapist: Radha Ellis, PT  10/26/2017

## 2017-11-15 ENCOUNTER — LAB VISIT (OUTPATIENT)
Dept: LAB | Facility: HOSPITAL | Age: 37
End: 2017-11-15
Attending: COLON & RECTAL SURGERY
Payer: COMMERCIAL

## 2017-11-15 ENCOUNTER — OFFICE VISIT (OUTPATIENT)
Dept: SURGERY | Facility: CLINIC | Age: 37
End: 2017-11-15
Payer: COMMERCIAL

## 2017-11-15 VITALS
HEIGHT: 72 IN | BODY MASS INDEX: 42.66 KG/M2 | WEIGHT: 315 LBS | HEART RATE: 73 BPM | SYSTOLIC BLOOD PRESSURE: 146 MMHG | DIASTOLIC BLOOD PRESSURE: 88 MMHG

## 2017-11-15 DIAGNOSIS — Z01.818 PRE-OP EVALUATION: Primary | ICD-10-CM

## 2017-11-15 DIAGNOSIS — K62.89 RECTAL MASS: ICD-10-CM

## 2017-11-15 DIAGNOSIS — Z01.818 PRE-OP EVALUATION: ICD-10-CM

## 2017-11-15 LAB
ALBUMIN SERPL BCP-MCNC: 3.7 G/DL
ALP SERPL-CCNC: 64 U/L
ALT SERPL W/O P-5'-P-CCNC: 44 U/L
ANION GAP SERPL CALC-SCNC: 9 MMOL/L
AST SERPL-CCNC: 44 U/L
BASOPHILS # BLD AUTO: 0.02 K/UL
BASOPHILS NFR BLD: 0.3 %
BILIRUB SERPL-MCNC: 0.7 MG/DL
BUN SERPL-MCNC: 16 MG/DL
CALCIUM SERPL-MCNC: 8.8 MG/DL
CHLORIDE SERPL-SCNC: 103 MMOL/L
CO2 SERPL-SCNC: 27 MMOL/L
CREAT SERPL-MCNC: 1.1 MG/DL
DIFFERENTIAL METHOD: ABNORMAL
EOSINOPHIL # BLD AUTO: 0.3 K/UL
EOSINOPHIL NFR BLD: 3.4 %
ERYTHROCYTE [DISTWIDTH] IN BLOOD BY AUTOMATED COUNT: 12.5 %
EST. GFR  (AFRICAN AMERICAN): >60 ML/MIN/1.73 M^2
EST. GFR  (NON AFRICAN AMERICAN): >60 ML/MIN/1.73 M^2
GLUCOSE SERPL-MCNC: 93 MG/DL
HCT VFR BLD AUTO: 43.9 %
HGB BLD-MCNC: 14.3 G/DL
IMM GRANULOCYTES # BLD AUTO: 0.02 K/UL
IMM GRANULOCYTES NFR BLD AUTO: 0.3 %
LYMPHOCYTES # BLD AUTO: 1.8 K/UL
LYMPHOCYTES NFR BLD: 22.9 %
MCH RBC QN AUTO: 26.9 PG
MCHC RBC AUTO-ENTMCNC: 32.6 G/DL
MCV RBC AUTO: 83 FL
MONOCYTES # BLD AUTO: 1.1 K/UL
MONOCYTES NFR BLD: 14 %
NEUTROPHILS # BLD AUTO: 4.6 K/UL
NEUTROPHILS NFR BLD: 59.1 %
NRBC BLD-RTO: 0 /100 WBC
PLATELET # BLD AUTO: 230 K/UL
PMV BLD AUTO: 11.7 FL
POTASSIUM SERPL-SCNC: 4.3 MMOL/L
PROT SERPL-MCNC: 7.9 G/DL
RBC # BLD AUTO: 5.31 M/UL
SODIUM SERPL-SCNC: 139 MMOL/L
WBC # BLD AUTO: 7.74 K/UL

## 2017-11-15 PROCEDURE — 80053 COMPREHEN METABOLIC PANEL: CPT

## 2017-11-15 PROCEDURE — 36415 COLL VENOUS BLD VENIPUNCTURE: CPT

## 2017-11-15 PROCEDURE — 99214 OFFICE O/P EST MOD 30 MIN: CPT | Mod: S$GLB,,, | Performed by: COLON & RECTAL SURGERY

## 2017-11-15 PROCEDURE — 99999 PR PBB SHADOW E&M-EST. PATIENT-LVL III: CPT | Mod: PBBFAC,,, | Performed by: COLON & RECTAL SURGERY

## 2017-11-15 PROCEDURE — 85025 COMPLETE CBC W/AUTO DIFF WBC: CPT

## 2017-11-15 NOTE — PROGRESS NOTES
Persistent anal mass.  No pain.  No bleeding    Past Medical History:   Diagnosis Date    Hypertension     Metabolic syndrome     Pre-Diabetic    Migraine headache     Seizure      Past Surgical History:   Procedure Laterality Date    LUNG SURGERY       Review of patient's allergies indicates:   Allergen Reactions    Toradol [ketorolac] Other (See Comments)     AIN-severe with ARF    Nsaids (non-steroidal anti-inflammatory drug) Other (See Comments)     AIN-severe     Current Outpatient Prescriptions on File Prior to Visit   Medication Sig Dispense Refill    amlodipine (NORVASC) 10 MG tablet       amoxicillin-clavulanate 875-125mg (AUGMENTIN) 875-125 mg per tablet Take 1 tablet by mouth 2 (two) times daily. 20 tablet 0    BYDUREON 2 mg SSRR       carvedilol (COREG) 25 MG tablet       dicyclomine (BENTYL) 20 mg tablet Take 20 mg by mouth every 6 (six) hours.      hydrocortisone-pramoxine (ANALPRAM-HC) 2.5-1 % Crea Place rectally 3 (three) times daily. 1 Tube 0    levocetirizine (XYZAL) 5 MG tablet Take 5 mg by mouth every evening.      liraglutide 0.6 mg/0.1 mL (18 mg/3 mL) PnIj Inject 1.8 mg into the skin before breakfast.      metFORMIN (GLUCOPHAGE-XR) 500 MG 24 hr tablet Take 1 tablet (500 mg total) by mouth 2 (two) times daily with meals. 120 tablet 1    montelukast (SINGULAIR) 10 mg tablet       pramoxine-hydrocortisone cream       rizatriptan (MAXALT) 10 MG tablet Take 10 mg by mouth as needed.      rosuvastatin (CRESTOR) 10 MG tablet Take 10 mg by mouth once daily.      topiramate (TOPAMAX) 50 MG tablet Take 50 mg by mouth 2 (two) times daily.      [DISCONTINUED] cyclobenzaprine (FLEXERIL) 10 MG tablet Take 10 mg by mouth 3 (three) times daily as needed for Muscle spasms.      [DISCONTINUED] cyclobenzaprine (FLEXERIL) 5 MG tablet       [DISCONTINUED] omeprazole (PRILOSEC) 40 MG capsule Take 1 capsule (40 mg total) by mouth once daily. 30 capsule 0    [DISCONTINUED] tramadol (ULTRAM) 50  mg tablet        No current facility-administered medications on file prior to visit.      Family History   Problem Relation Age of Onset    Hypertension Mother     Diabetes Mother     Diabetes Father     Hypertension Father     Asthma Brother      Social History     Social History    Marital status:      Spouse name: N/A    Number of children: N/A    Years of education: N/A     Occupational History    Not on file.     Social History Main Topics    Smoking status: Former Smoker     Types: Cigars     Quit date: 10/4/2016    Smokeless tobacco: Never Used    Alcohol use Yes      Comment: Occasional wine, beer, and liqor    Drug use: No    Sexual activity: Not on file     Other Topics Concern    Not on file     Social History Narrative    No narrative on file       ROS:  GENERAL: No fever, chills, fatigability or weight loss.  Integument:  No rashes, redness, icterus  CHEST: Denies ASKEW, cyanosis, wheezing, cough and sputum production.  CARDIOVASCULAR: Denies chest pain, PND, orthopnea or reduced exercise tolerance.  GI:  Denies abd pain, dysphagia, nausea, vomiting, no hematemesis, no rectal pain  : Denies burning on urination, no hematuria, no bacteriuria  MSK:  No deformities, swelling, joint pain swelling  Neurologic:  No HAs, seizures, weakness, paresthesias, gait problems    PE  Obese male in NAD  BP (!) 146/88 (BP Location: Left arm, Patient Position: Sitting, BP Method: Large (Automatic))   Pulse 73   Ht 6' (1.829 m)   Wt (!) 177.8 kg (391 lb 15.7 oz)   BMI 53.16 kg/m²   AT NC EOMI  Neck supple, trachea midline  Chest symmetric, nl excursions, no retractions  Breathing comfortably  Rectal   Anal mass, right anterior - 2 cm - soft, non tender.      Assessment  Anal mass    Recommend  Excision of anal mass.

## 2017-11-17 DIAGNOSIS — K62.9 ANAL LESION: Primary | ICD-10-CM

## 2017-11-17 RX ORDER — ACETAMINOPHEN 10 MG/ML
1000 INJECTION, SOLUTION INTRAVENOUS
Status: CANCELLED | OUTPATIENT
Start: 2017-11-17 | End: 2017-11-17

## 2017-11-17 RX ORDER — MUPIROCIN 20 MG/G
OINTMENT TOPICAL
Status: CANCELLED | OUTPATIENT
Start: 2017-11-17

## 2017-11-17 RX ORDER — SODIUM CHLORIDE 9 MG/ML
INJECTION, SOLUTION INTRAVENOUS CONTINUOUS
Status: CANCELLED | OUTPATIENT
Start: 2017-11-17

## 2017-11-17 RX ORDER — HEPARIN SODIUM 5000 [USP'U]/ML
5000 INJECTION, SOLUTION INTRAVENOUS; SUBCUTANEOUS
Status: CANCELLED | OUTPATIENT
Start: 2017-11-17

## 2017-12-11 PROBLEM — E78.5 HYPERLIPIDEMIA: Status: ACTIVE | Noted: 2017-12-11

## 2017-12-11 PROBLEM — J32.9 CHRONIC SINUSITIS: Status: ACTIVE | Noted: 2017-12-11

## 2017-12-21 ENCOUNTER — TELEPHONE (OUTPATIENT)
Dept: SURGERY | Facility: CLINIC | Age: 37
End: 2017-12-21

## 2017-12-22 ENCOUNTER — ANESTHESIA EVENT (OUTPATIENT)
Dept: SURGERY | Facility: HOSPITAL | Age: 37
End: 2017-12-22
Payer: COMMERCIAL

## 2017-12-22 ENCOUNTER — ANESTHESIA (OUTPATIENT)
Dept: SURGERY | Facility: HOSPITAL | Age: 37
End: 2017-12-22
Payer: COMMERCIAL

## 2018-01-12 DIAGNOSIS — J32.4 CHRONIC PANSINUSITIS: Primary | ICD-10-CM

## 2018-01-22 ENCOUNTER — HOSPITAL ENCOUNTER (OUTPATIENT)
Dept: RADIOLOGY | Facility: OTHER | Age: 38
Discharge: HOME | End: 2018-01-22
Attending: OTOLARYNGOLOGY
Payer: COMMERCIAL

## 2018-01-22 DIAGNOSIS — J32.4 CHRONIC PANSINUSITIS: ICD-10-CM

## 2018-01-22 PROCEDURE — 70486 CT MAXILLOFACIAL W/O DYE: CPT | Mod: TC

## 2018-01-22 PROCEDURE — 70486 CT MAXILLOFACIAL W/O DYE: CPT | Mod: 26,,, | Performed by: RADIOLOGY

## 2018-02-07 ENCOUNTER — OFFICE VISIT (OUTPATIENT)
Dept: SURGERY | Facility: CLINIC | Age: 38
End: 2018-02-07
Payer: COMMERCIAL

## 2018-02-07 VITALS
SYSTOLIC BLOOD PRESSURE: 135 MMHG | HEIGHT: 71 IN | WEIGHT: 315 LBS | HEART RATE: 83 BPM | DIASTOLIC BLOOD PRESSURE: 82 MMHG | BODY MASS INDEX: 44.1 KG/M2

## 2018-02-07 DIAGNOSIS — K62.89 RECTAL MASS: Primary | ICD-10-CM

## 2018-02-07 PROCEDURE — 99999 PR PBB SHADOW E&M-EST. PATIENT-LVL III: CPT | Mod: PBBFAC,,, | Performed by: COLON & RECTAL SURGERY

## 2018-02-07 PROCEDURE — 99213 OFFICE O/P EST LOW 20 MIN: CPT | Mod: S$GLB,,, | Performed by: COLON & RECTAL SURGERY

## 2018-02-07 PROCEDURE — 3008F BODY MASS INDEX DOCD: CPT | Mod: S$GLB,,, | Performed by: COLON & RECTAL SURGERY

## 2018-02-07 RX ORDER — PREDNISONE 10 MG/1
TABLET ORAL
Status: ON HOLD | COMMUNITY
Start: 2018-01-10 | End: 2018-03-09

## 2018-02-07 NOTE — PROGRESS NOTES
NO pain.  No bleeding.  No change in BMs    Past Medical History:   Diagnosis Date    Adult general medical examination 04/17/2017    Benign hypertensive heart disease without congestive heart failure     Familial hypercholesterolemia     Hyperlipidemia     Hypertension     Metabolic syndrome     Pre-Diabetic    Metabolic syndrome X     Migraine headache     Seizure      Past Surgical History:   Procedure Laterality Date    LUNG SURGERY       Review of patient's allergies indicates:   Allergen Reactions    Toradol [ketorolac] Other (See Comments)     AIN-severe with ARF    Nsaids (non-steroidal anti-inflammatory drug) Other (See Comments)     AIN-severe     Current Outpatient Prescriptions on File Prior to Visit   Medication Sig Dispense Refill    amLODIPine (NORVASC) 10 MG tablet Take 1 tablet (10 mg total) by mouth once daily. 30 tablet 5    carvedilol (COREG) 25 MG tablet Take 1 tablet (25 mg total) by mouth 2 (two) times daily. 60 tablet 5    dicyclomine (BENTYL) 20 mg tablet Take 20 mg by mouth every 6 (six) hours.      levocetirizine (XYZAL) 5 MG tablet Take 5 mg by mouth every evening.      metFORMIN (GLUCOPHAGE-XR) 500 MG 24 hr tablet Take 2 tablets (1,000 mg total) by mouth before dinner. 2 tabs with dinner 60 tablet 5    montelukast (SINGULAIR) 10 mg tablet Take 1 tablet (10 mg total) by mouth every evening. 30 tablet 5    rosuvastatin (CRESTOR) 10 MG tablet Take 1 tablet (10 mg total) by mouth once daily. 30 tablet 5     No current facility-administered medications on file prior to visit.      Family History   Problem Relation Age of Onset    Hypertension Mother     Diabetes Mother     Diabetes Father     Hypertension Father     Asthma Brother      Social History     Social History    Marital status:      Spouse name: N/A    Number of children: N/A    Years of education: N/A     Occupational History    Not on file.     Social History Main Topics    Smoking status:  "Former Smoker     Types: Cigars     Quit date: 10/4/2016    Smokeless tobacco: Never Used    Alcohol use Yes      Comment: Occasional wine, beer, and liqor    Drug use: No    Sexual activity: Yes     Partners: Female     Other Topics Concern    Not on file     Social History Narrative    No narrative on file     ROS:  GENERAL: No fever, chills, fatigability or weight loss.  Integument:  No rashes, redness, icterus  CHEST: Denies ASKEW, cyanosis, wheezing, cough and sputum production.  CARDIOVASCULAR: Denies chest pain, PND, orthopnea or reduced exercise tolerance.  GI:  Denies abd pain, dysphagia, nausea, vomiting, no hematemesis, no rectal pain  : Denies burning on urination, no hematuria, no bacteriuria  MSK:  No deformities, swelling, joint pain swelling  Neurologic:  No HAs, seizures, weakness, paresthesias, gait problems    PE  Appears well  Blood pressure 135/82, pulse 83, height 5' 11" (1.803 m), weight (!) 181.7 kg (400 lb 9.2 oz).    Skin anicteric  AT NC EOMI  Neck supple, trachea midline  Chest symmetric, nl excursions  Rectal   Inspection:        Assessment  Anal mass, soft, non tender - does not appear appreciably enlarged    Recommend  Excison of anal mass        "

## 2018-02-15 DIAGNOSIS — K62.9 ANAL LESION: Primary | ICD-10-CM

## 2018-03-08 ENCOUNTER — PATIENT MESSAGE (OUTPATIENT)
Dept: SURGERY | Facility: HOSPITAL | Age: 38
End: 2018-03-08

## 2018-03-08 ENCOUNTER — TELEPHONE (OUTPATIENT)
Dept: SURGERY | Facility: CLINIC | Age: 38
End: 2018-03-08

## 2018-03-08 NOTE — TELEPHONE ENCOUNTER
Called but there was n/a. Left message if he has questions that he needs answered befor surgery he can call the doctor on call. If not he will see the doctor before surgery tomorrow.

## 2018-03-08 NOTE — TELEPHONE ENCOUNTER
----- Message from Charmaine Macario sent at 3/8/2018 11:40 AM CST -----  Contact: self  Pt would like to discuss his surgery that is scheduled for tomorrow.  He can be reached at [phone]

## 2018-03-09 ENCOUNTER — SURGERY (OUTPATIENT)
Age: 38
End: 2018-03-09

## 2018-03-09 ENCOUNTER — HOSPITAL ENCOUNTER (OUTPATIENT)
Facility: HOSPITAL | Age: 38
Discharge: HOME | End: 2018-03-09
Attending: COLON & RECTAL SURGERY | Admitting: COLON & RECTAL SURGERY
Payer: COMMERCIAL

## 2018-03-09 VITALS
DIASTOLIC BLOOD PRESSURE: 89 MMHG | HEART RATE: 72 BPM | BODY MASS INDEX: 42.66 KG/M2 | SYSTOLIC BLOOD PRESSURE: 130 MMHG | OXYGEN SATURATION: 100 % | RESPIRATION RATE: 16 BRPM | WEIGHT: 315 LBS | TEMPERATURE: 98 F | HEIGHT: 72 IN

## 2018-03-09 DIAGNOSIS — K62.9 ANAL LESION: ICD-10-CM

## 2018-03-09 DIAGNOSIS — K62.89 RECTAL MASS: Primary | ICD-10-CM

## 2018-03-09 LAB — POCT GLUCOSE: 97 MG/DL (ref 70–110)

## 2018-03-09 PROCEDURE — C9290 INJ, BUPIVACAINE LIPOSOME: HCPCS | Performed by: COLON & RECTAL SURGERY

## 2018-03-09 PROCEDURE — 82962 GLUCOSE BLOOD TEST: CPT | Performed by: COLON & RECTAL SURGERY

## 2018-03-09 PROCEDURE — D9220A PRA ANESTHESIA: Mod: ANES,,, | Performed by: ANESTHESIOLOGY

## 2018-03-09 PROCEDURE — 88307 TISSUE EXAM BY PATHOLOGIST: CPT | Mod: 26,,, | Performed by: PATHOLOGY

## 2018-03-09 PROCEDURE — 36000707: Performed by: COLON & RECTAL SURGERY

## 2018-03-09 PROCEDURE — 46922 EXCISION OF ANAL LESION(S): CPT | Mod: ,,, | Performed by: COLON & RECTAL SURGERY

## 2018-03-09 PROCEDURE — D9220A PRA ANESTHESIA: Mod: CRNA,,, | Performed by: NURSE ANESTHETIST, CERTIFIED REGISTERED

## 2018-03-09 PROCEDURE — 25000003 PHARM REV CODE 250: Performed by: COLON & RECTAL SURGERY

## 2018-03-09 PROCEDURE — 63600175 PHARM REV CODE 636 W HCPCS: Performed by: NURSE ANESTHETIST, CERTIFIED REGISTERED

## 2018-03-09 PROCEDURE — 88307 TISSUE EXAM BY PATHOLOGIST: CPT | Performed by: PATHOLOGY

## 2018-03-09 PROCEDURE — 71000044 HC DOSC ROUTINE RECOVERY FIRST HOUR: Performed by: COLON & RECTAL SURGERY

## 2018-03-09 PROCEDURE — 37000008 HC ANESTHESIA 1ST 15 MINUTES: Performed by: COLON & RECTAL SURGERY

## 2018-03-09 PROCEDURE — 25000003 PHARM REV CODE 250: Performed by: NURSE PRACTITIONER

## 2018-03-09 PROCEDURE — 36000706: Performed by: COLON & RECTAL SURGERY

## 2018-03-09 PROCEDURE — 71000015 HC POSTOP RECOV 1ST HR: Performed by: COLON & RECTAL SURGERY

## 2018-03-09 PROCEDURE — 37000009 HC ANESTHESIA EA ADD 15 MINS: Performed by: COLON & RECTAL SURGERY

## 2018-03-09 PROCEDURE — 63600175 PHARM REV CODE 636 W HCPCS: Performed by: COLON & RECTAL SURGERY

## 2018-03-09 RX ORDER — OXYCODONE AND ACETAMINOPHEN 5; 325 MG/1; MG/1
1 TABLET ORAL EVERY 4 HOURS PRN
Status: DISCONTINUED | OUTPATIENT
Start: 2018-03-09 | End: 2018-03-09 | Stop reason: HOSPADM

## 2018-03-09 RX ORDER — SODIUM CHLORIDE 9 MG/ML
INJECTION, SOLUTION INTRAVENOUS CONTINUOUS
Status: DISCONTINUED | OUTPATIENT
Start: 2018-03-09 | End: 2018-03-09 | Stop reason: HOSPADM

## 2018-03-09 RX ORDER — LIDOCAINE HYDROCHLORIDE 10 MG/ML
1 INJECTION, SOLUTION EPIDURAL; INFILTRATION; INTRACAUDAL; PERINEURAL ONCE
Status: COMPLETED | OUTPATIENT
Start: 2018-03-09 | End: 2018-03-09

## 2018-03-09 RX ORDER — LIDOCAINE HCL/PF 100 MG/5ML
SYRINGE (ML) INTRAVENOUS
Status: DISCONTINUED | OUTPATIENT
Start: 2018-03-09 | End: 2018-03-09

## 2018-03-09 RX ORDER — ONDANSETRON 2 MG/ML
4 INJECTION INTRAMUSCULAR; INTRAVENOUS ONCE AS NEEDED
Status: DISCONTINUED | OUTPATIENT
Start: 2018-03-09 | End: 2018-03-09 | Stop reason: HOSPADM

## 2018-03-09 RX ORDER — SODIUM CHLORIDE 0.9 % (FLUSH) 0.9 %
3 SYRINGE (ML) INJECTION
Status: DISCONTINUED | OUTPATIENT
Start: 2018-03-09 | End: 2018-03-09 | Stop reason: HOSPADM

## 2018-03-09 RX ORDER — FENTANYL CITRATE 50 UG/ML
INJECTION, SOLUTION INTRAMUSCULAR; INTRAVENOUS
Status: DISCONTINUED | OUTPATIENT
Start: 2018-03-09 | End: 2018-03-09

## 2018-03-09 RX ORDER — OXYCODONE AND ACETAMINOPHEN 5; 325 MG/1; MG/1
1 TABLET ORAL EVERY 4 HOURS PRN
Qty: 41 TABLET | Refills: 0 | Status: SHIPPED | OUTPATIENT
Start: 2018-03-09 | End: 2018-04-09 | Stop reason: ALTCHOICE

## 2018-03-09 RX ORDER — BUPIVACAINE HYDROCHLORIDE AND EPINEPHRINE 5; 5 MG/ML; UG/ML
INJECTION, SOLUTION EPIDURAL; INTRACAUDAL; PERINEURAL
Status: DISCONTINUED | OUTPATIENT
Start: 2018-03-09 | End: 2018-03-09 | Stop reason: HOSPADM

## 2018-03-09 RX ORDER — PROPOFOL 10 MG/ML
VIAL (ML) INTRAVENOUS CONTINUOUS PRN
Status: DISCONTINUED | OUTPATIENT
Start: 2018-03-09 | End: 2018-03-09

## 2018-03-09 RX ORDER — MIDAZOLAM HYDROCHLORIDE 1 MG/ML
INJECTION, SOLUTION INTRAMUSCULAR; INTRAVENOUS
Status: DISCONTINUED | OUTPATIENT
Start: 2018-03-09 | End: 2018-03-09

## 2018-03-09 RX ORDER — ONDANSETRON 2 MG/ML
INJECTION INTRAMUSCULAR; INTRAVENOUS
Status: DISCONTINUED | OUTPATIENT
Start: 2018-03-09 | End: 2018-03-09

## 2018-03-09 RX ORDER — AMOXICILLIN 250 MG
1 CAPSULE ORAL DAILY
COMMUNITY
Start: 2018-03-09 | End: 2019-09-16

## 2018-03-09 RX ORDER — MUPIROCIN 20 MG/G
OINTMENT TOPICAL
Status: DISCONTINUED | OUTPATIENT
Start: 2018-03-09 | End: 2018-03-09 | Stop reason: HOSPADM

## 2018-03-09 RX ADMIN — MIDAZOLAM HYDROCHLORIDE 2 MG: 1 INJECTION, SOLUTION INTRAMUSCULAR; INTRAVENOUS at 08:03

## 2018-03-09 RX ADMIN — LIDOCAINE HYDROCHLORIDE 1 MG: 10 INJECTION, SOLUTION EPIDURAL; INFILTRATION; INTRACAUDAL; PERINEURAL at 07:03

## 2018-03-09 RX ADMIN — ONDANSETRON 4 MG: 2 INJECTION INTRAMUSCULAR; INTRAVENOUS at 09:03

## 2018-03-09 RX ADMIN — PROPOFOL 125 MCG/KG/MIN: 10 INJECTION, EMULSION INTRAVENOUS at 08:03

## 2018-03-09 RX ADMIN — FENTANYL CITRATE 100 MCG: 50 INJECTION, SOLUTION INTRAMUSCULAR; INTRAVENOUS at 08:03

## 2018-03-09 RX ADMIN — BUPIVACAINE HYDROCHLORIDE AND EPINEPHRINE BITARTRATE 30 ML: 5; .0091 INJECTION, SOLUTION EPIDURAL; INTRACAUDAL; PERINEURAL at 09:03

## 2018-03-09 RX ADMIN — SODIUM CHLORIDE: 0.9 INJECTION, SOLUTION INTRAVENOUS at 07:03

## 2018-03-09 RX ADMIN — LIDOCAINE HYDROCHLORIDE 100 MG: 20 INJECTION, SOLUTION INTRAVENOUS at 08:03

## 2018-03-09 RX ADMIN — MUPIROCIN: 20 OINTMENT TOPICAL at 08:03

## 2018-03-09 RX ADMIN — BUPIVACAINE 20 ML: 13.3 INJECTION, SUSPENSION, LIPOSOMAL INFILTRATION at 09:03

## 2018-03-09 NOTE — INTERVAL H&P NOTE
The patient has been examined and the H&P has been reviewed:    I concur with the findings and no changes have occurred since H&P was written.    Anesthesia/Surgery risks, benefits and alternative options discussed and understood by patient/family.          Active Hospital Problems    Diagnosis  POA    Anal lesion [K62.9]  Yes      Resolved Hospital Problems    Diagnosis Date Resolved POA   No resolved problems to display.

## 2018-03-09 NOTE — DISCHARGE INSTRUCTIONS

## 2018-03-09 NOTE — OP NOTE
[facility]  Surgery Department  Operative Note    SUMMARY     Date of Procedure: 3/9/2018     Procedure: Procedure(s) (LRB):  EXCISION-LESION-ANUS (N/A)     Surgeon(s) and Role:     * JOYCE Mathias MD - Primary     * Rajan Espino MD - Resident - Assisting    Pre-Operative Diagnosis: Anal lesion [K62.9]    Post-Operative Diagnosis: Post-Op Diagnosis Codes:     * Anal lesion [K62.9]    Anesthesia: Choice    Technical Procedures Used:   1.  Excision of posterior anal cystic lesion    Description of the Findings of the Procedure:   The patient was identified, brought to the Operating   Room and placed on the table in a prone position after obtaining informed   consent and after ensuring adequate padding of all pressure points.  Once   monitored anesthesia care was initiated, the table was jackknifed and the   buttocks were taped apart to efface the anal verge.  The perianal region was   prepped and draped in the usual sterile fashion.  On initial inspection, there   was a soft cystic mass in the posterior aspect of the anua.  The area surrounding this was infiltrated with a   combination of Exparel and 0.25% Marcaine.  A deep anal block was performed as   well.  The skin around the external component of the lesion was   incised sharply.  The tissue was dissected off the underlying sphincter   musculature.  During dissection we entered the cystic cavity from which grey brown material drained.  This was thought to be a cyst or the remnants of an old thrombosed hemorrhoid.  Mobilization of the cyst was carried up into the   anal canal, mobilizing the internal portion off of the underlying sphincter   muscle as well.  The cystic lesion was excised and sent to Pathology.     A 3-0 Vicryl suture was placed in figure of eight fashion proximally and was run distally in continuous fashion to   reapproximate the mucosa and the skin edges.     The anal canal was irrigated.  Hemostasis was noted to be  adequate.  No other   significant anal pathology was present.    The patient tolerated the procedure well. No complications occurred during or immediately after the procedure. The patient was taken to the PACU satisfactory condition.       Complications: No    Estimated Blood Loss (EBL): 20cc           Implants: * No implants in log *    Specimens:   Specimen (12h ago through future)    Start     Ordered    03/09/18 0941  Specimen to Pathology - Surgery  Once     Comments:  1. Anal mass- Perm      03/09/18 0941                  Condition: Good    Disposition: PACU - hemodynamically stable.    Attestation: Dr. Mathias was present for the entirety of the case

## 2018-03-09 NOTE — BRIEF OP NOTE
[facility]  Brief Operative Note     SUMMARY     Surgery Date: 3/9/2018     Surgeon(s) and Role:     * JOYCE Mathias MD - Primary     * Rajan Espino MD - Resident - Assisting    Pre-op Diagnosis:  Anal lesion [K62.9]    Post-op Diagnosis:  Post-Op Diagnosis Codes:     * Anal lesion [K62.9]    Procedure(s) (LRB):  EXCISION-LESION-ANUS (N/A)    Anesthesia: Choice    Description of the findings of the procedure: Anterior perianal cystic lesion excised.      Estimated Blood Loss: 20cc         Specimens:   Specimen (12h ago through future)    Start     Ordered    03/09/18 0941  Specimen to Pathology - Surgery  Once     Comments:  1. Anal mass- Perm      03/09/18 0941          Discharge Note    SUMMARY     Admit Date: 3/9/2018    Discharge Date and Time:  03/09/2018 9:44 AM    Hospital Course (synopsis of major diagnoses, care, treatment, and services provided during the course of the hospital stay): Pt admitted for outpatient procedure, tolerated procedure well, no complications.  Pt discharged home in stable condition.       Final Diagnosis: Post-Op Diagnosis Codes:     * Anal lesion [K62.9]    Disposition: Home or Self Care    Follow Up/Patient Instructions:     Medications:  Reconciled Home Medications:   Current Discharge Medication List      START taking these medications    Details   oxyCODONE-acetaminophen (PERCOCET) 5-325 mg per tablet Take 1 tablet by mouth every 4 (four) hours as needed.  Qty: 41 tablet, Refills: 0      senna-docusate 8.6-50 mg (SENNA WITH DOCUSATE SODIUM) 8.6-50 mg per tablet Take 1 tablet by mouth once daily.         CONTINUE these medications which have NOT CHANGED    Details   amLODIPine (NORVASC) 10 MG tablet Take 1 tablet (10 mg total) by mouth once daily.  Qty: 30 tablet, Refills: 5      carvedilol (COREG) 25 MG tablet Take 1 tablet (25 mg total) by mouth 2 (two) times daily.  Qty: 60 tablet, Refills: 5      levocetirizine (XYZAL) 5 MG tablet Take 1 tablet (5  mg total) by mouth every evening.  Qty: 30 tablet, Refills: 3      metFORMIN (GLUCOPHAGE-XR) 500 MG 24 hr tablet Take 2 tablets (1,000 mg total) by mouth before dinner. 2 tabs with dinner  Qty: 60 tablet, Refills: 5      montelukast (SINGULAIR) 10 mg tablet Take 1 tablet (10 mg total) by mouth every evening.  Qty: 30 tablet, Refills: 5      multivitamin capsule Take 1 capsule by mouth 2 (two) times daily.       ranitidine (ZANTAC) 300 MG tablet Take 300 mg by mouth once daily.  Refills: 1      rosuvastatin (CRESTOR) 10 MG tablet Take 1 tablet (10 mg total) by mouth once daily.  Qty: 30 tablet, Refills: 5             Discharge Procedure Orders  Diet Adult Regular     Lifting restrictions   Order Comments: No lifting more than 10 pounds for 7 days     Notify your health care provider if you experience any of the following:  redness, tenderness, or signs of infection (pain, swelling, redness, odor or green/yellow discharge around incision site)     Notify your health care provider if you experience any of the following:  severe uncontrolled pain     Notify your health care provider if you experience any of the following:  persistent nausea and vomiting or diarrhea     Notify your health care provider if you experience any of the following:  temperature >100.4     No dressing needed   Order Comments: Change dressing as needed.  OK to shower       Follow-up Information     H Robert Mathias MD In 2 weeks.    Specialty:  Colon and Rectal Surgery  Contact information:  [address] [facility] [id number] [phone]

## 2018-03-09 NOTE — ANESTHESIA POSTPROCEDURE EVALUATION
Anesthesia Post Evaluation    Patient: Denny Sullivan    Procedure(s) Performed: Procedure(s) (LRB):  EXCISION-LESION-ANUS (N/A)    Final Anesthesia Type: general  Patient location during evaluation: PACU  Patient participation: Yes- Able to Participate  Level of consciousness: awake and alert and oriented  Post-procedure vital signs: reviewed and stable  Pain management: adequate  Airway patency: patent  PONV status at discharge: No PONV  Anesthetic complications: no      Cardiovascular status: hemodynamically stable  Respiratory status: unassisted  Hydration status: euvolemic  Follow-up not needed.        Visit Vitals  /89   Pulse 72   Temp 36.6 °C (97.9 °F) (Temporal)   Resp 16   Ht 6' (1.829 m)   Wt (!) 181.4 kg (400 lb)   SpO2 100%   BMI 54.25 kg/m²       Pain/Jj Score: Pain Assessment Performed: Yes (3/9/2018 10:44 AM)  Presence of Pain: denies (3/9/2018 10:44 AM)  Jj Score: 10 (3/9/2018 10:14 AM)  Modified Jj Score: 19 (3/9/2018  9:44 AM)

## 2018-03-09 NOTE — ANESTHESIA PREPROCEDURE EVALUATION
2018  Denny Sullivan is a 37 y.o., male.  Pre-operative evaluation for EXCISION-LESION-ANUS (N/A)    Chief Complaint: anal lesion    PMH:  Interstitial nephritisand early diabetic nephropathy in 2013- NSAIDs contra-indicated  Obesity  HTN  HLP   Pre-diabetic  GERD suspected per allergist-controlled with Zantac  Past Surgical History:   Procedure Laterality Date    LUNG SURGERY           Vital Signs Range (Last 24H):         CBC:   No results for input(s): WBC, RBC, HGB, HCT, PLT, MCV, MCH, MCHC in the last 720 hours.    CMP: No results for input(s): NA, K, CL, CO2, BUN, CREATININE, GLU, MG, PHOS, CALCIUM, ALBUMIN, PROT, ALKPHOS, ALT, AST, BILITOT in the last 720 hours.    INR:  No results for input(s): PT, INR, PROTIME, APTT in the last 720 hours.      Diagnostic Studies:      EKD Echo:  Anesthesia Evaluation    I have reviewed the Patient Summary Reports.     I have reviewed the Nursing Notes.   I have reviewed the Medications.     Review of Systems  Anesthesia Hx:  No problems with previous Anesthesia    Social:  Non-Smoker    Pulmonary:  Pulmonary Normal        Physical Exam  General:  Morbid Obesity    Airway/Jaw/Neck:  Airway Findings: Mouth Opening: Normal Tongue: Large  General Airway Assessment: Possible difficult mask airway, Possible difficult intubation  Mallampati: II  Improves to I with phonation.  TM Distance: Normal, at least 6 cm  Jaw/Neck Findings:  Neck ROM: Normal ROM      Dental:  Dental Findings: In tact   Chest/Lungs:  Chest/Lungs Findings: Clear to auscultation, Normal Respiratory Rate     Heart/Vascular:  Heart Findings:       Mental Status:  Mental Status Findings:  Cooperative, Alert and Oriented         Anesthesia Plan  Type of Anesthesia, risks & benefits discussed:  Anesthesia Type:  MAC, general  Patient's Preference:   Intra-op Monitoring Plan:   Intra-op  Monitoring Plan Comments:   Post Op Pain Control Plan:   Post Op Pain Control Plan Comments:   Induction:   IV  Beta Blocker:  Patient is not currently on a Beta-Blocker (No further documentation required).       Informed Consent: Patient understands risks and agrees with Anesthesia plan.  Questions answered. Anesthesia consent signed with patient.  ASA Score: 3     Day of Surgery Review of History & Physical:    H&P update referred to the surgeon.         Ready For Surgery From Anesthesia Perspective.

## 2018-03-09 NOTE — TRANSFER OF CARE
Anesthesia Transfer of Care Note    Patient: Denny Sullivan    Procedure(s) Performed: Procedure(s) (LRB):  EXCISION-LESION-ANUS (N/A)    Patient location: PACU    Anesthesia Type: general    Transport from OR: Transported from OR on 6-10 L/min O2 by face mask with adequate spontaneous ventilation    Post pain: adequate analgesia    Post assessment: no apparent anesthetic complications and tolerated procedure well    Post vital signs: stable    Level of consciousness: awake, alert and oriented    Nausea/Vomiting: no nausea/vomiting    Complications: none    Transfer of care protocol was followed      Last vitals:   Visit Vitals  /82   Pulse 85   Temp 36.8 °C (98.2 °F) (Oral)   Resp 19   Ht 6' (1.829 m)   Wt (!) 181.4 kg (400 lb)   SpO2 95%   BMI 54.25 kg/m²

## 2018-03-11 ENCOUNTER — NURSE TRIAGE (OUTPATIENT)
Dept: ADMINISTRATIVE | Facility: CLINIC | Age: 38
End: 2018-03-11

## 2018-03-11 NOTE — TELEPHONE ENCOUNTER
"Kristin stated that he had a recent surgical procedure, was prescribed Percocet and now has constipation. Advised per protocol and he verbalized understanding. Informed him that I would send a message to the provider's office for when they reopen tomorrow. Instructed to call back with any additional problems and/or concerns    Reason for Disposition   Taking new prescription medication    Answer Assessment - Initial Assessment Questions  1. STOOL PATTERN OR FREQUENCY: "How often do you pass bowel movements (BMs)?"  (Normal range: tid to q 3 days)  "When was the last BM passed?"        Normally has a bowel movement daily. LBM Friday night  2. STRAINING: "Do you have to strain to have a BM?"       no  3. RECTAL PAIN: "Does your rectum hurt when the stool comes out?" If so, ask: "Do you have hemorrhoids? How bad is the pain?"  (Scale 1-10; or mild, moderate, severe)      Yes but may be procedure  4. STOOL COMPOSITION: "Are the stools hard?"       yes  5. BLOOD ON STOOLS: "Has there been any blood on the toilet tissue or on the surface of the BM?" If so, ask: "When was the last time?"       Yes on Friday but not since  6. CHRONIC CONSTIPATION: "Is this a new problem for you?"  If no, ask: How long have you had this problem?" (days, weeks, months)       no  7. CHANGES IN DIET: "Have there been any recent changes in your diet?"       no  8. MEDICATIONS: "Have you been taking any new medications?"      Percocet   9. LAXATIVES: "Have you been using any laxatives or enemas?"  If yes, ask "What, how often, and when was the last time?"      Miralax daily with no results  10. CAUSE: "What do you think is causing the constipation?"         Recent surgery  11. OTHER SYMPTOMS: "Do you have any other symptoms?" (e.g., abdominal pain, fever, vomiting)        no  12. PREGNANCY: "Is there any chance you are pregnant?" "When was your last menstrual period?"        N/A    Protocols used: [facility]-      "

## 2018-03-12 ENCOUNTER — TELEPHONE (OUTPATIENT)
Dept: SURGERY | Facility: CLINIC | Age: 38
End: 2018-03-12

## 2018-03-12 NOTE — TELEPHONE ENCOUNTER
----- Message from Eloisa Mendez sent at 3/12/2018  8:29 AM CDT -----  Contact: Self- [phone]  Stew- pt called to schedule his post op appt- thinks next available on 3/28 might be too far out- would like to come 3/22 if possible- please call pt back at [phone]

## 2018-03-12 NOTE — TELEPHONE ENCOUNTER
Spoke with patient. Post op appointment offered 3/21. Patient states cannot come that day. Requested 3/27.  Scheduled.

## 2018-03-14 ENCOUNTER — PATIENT MESSAGE (OUTPATIENT)
Dept: SURGERY | Facility: CLINIC | Age: 38
End: 2018-03-14

## 2018-03-20 ENCOUNTER — TELEPHONE (OUTPATIENT)
Dept: SURGERY | Facility: CLINIC | Age: 38
End: 2018-03-20

## 2018-03-27 ENCOUNTER — OFFICE VISIT (OUTPATIENT)
Dept: SURGERY | Facility: CLINIC | Age: 38
End: 2018-03-27
Payer: COMMERCIAL

## 2018-03-27 VITALS
HEIGHT: 72 IN | DIASTOLIC BLOOD PRESSURE: 87 MMHG | SYSTOLIC BLOOD PRESSURE: 172 MMHG | HEART RATE: 72 BPM | WEIGHT: 315 LBS | BODY MASS INDEX: 42.66 KG/M2

## 2018-03-27 DIAGNOSIS — K62.9 ANAL LESION: Primary | ICD-10-CM

## 2018-03-27 PROCEDURE — 99999 PR PBB SHADOW E&M-EST. PATIENT-LVL II: CPT | Mod: PBBFAC,,, | Performed by: COLON & RECTAL SURGERY

## 2018-03-27 PROCEDURE — 99024 POSTOP FOLLOW-UP VISIT: CPT | Mod: S$GLB,,, | Performed by: COLON & RECTAL SURGERY

## 2018-03-27 NOTE — PROGRESS NOTES
HPI:  Denny Sullivan is a 37 y.o. male with history of anal lesion s/p excision 3/9/2018.  Feels well.  Minimal drainage.  No bleeding.  No pain.    SPECIMEN  1) Anal mass.  FINAL PATHOLOGIC DIAGNOSIS  EXCISION FROM THE ANUS:  NO CARCINOMA OR DYSPLASIA IDENTIFIED  HEMORRHOIDS  AREA OF NONSPECIFIC ULCERATION WITH ASSOCIATED CHRONIC INFLAMMATION AND FIBROSIS  FIBROEPITHELIAL SKIN TAG FORMATION WITH ASSOCIATED FIBROSIS  Diagnosed by: Albin Al M.D.  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    RENAL BIOPSY      to confirm kidney function had returned       Review of patient's allergies indicates:   Allergen Reactions    Toradol [ketorolac] Other (See Comments)     AIN-severe with ARF    Nsaids (non-steroidal anti-inflammatory drug) Other (See Comments)     AIN-severe    Levaquin [levofloxacin]      rash       Family History   Problem Relation Age of Onset    Hypertension Mother     Diabetes Mother     Diabetes Father     Hypertension Father     Asthma Brother        Social History     Social History    Marital status:      Spouse name: N/A    Number of children: N/A    Years of education: N/A     Social History Main Topics    Smoking status: Former Smoker     Types: Cigars     Quit date: 10/4/2016    Smokeless tobacco: Never Used    Alcohol use Yes      Comment: Occasional wine, beer, and liqor    Drug use: No    Sexual activity: Yes     Partners: Female     Other Topics Concern    Not on file  "    Social History Narrative    No narrative on file       ROS:  GENERAL: No fever, chills, fatigability or weight loss.  Integument: No rashes, redness, icterus  CHEST: Denies ASKEW, cyanosis, wheezing, cough and sputum production.  CARDIOVASCULAR: Denies chest pain, PND, orthopnea or reduced exercise tolerance.  GI: Denies abd pain, dysphagia, nausea, vomiting, no hematemesis   : Denies burning on urination, no hematuria, no bacteriuria  MSK: No deformities, swelling, joint pain swelling  Neurologic: No HAs, seizures, weakness, paresthesias, gait problems    PE:  General appearance nontoxic   BP (!) 172/87   Pulse 72   Ht 6' 0.01" (1.829 m)   Wt (!) 177.3 kg (390 lb 14 oz)   BMI 53.00 kg/m²   Rectal  Inspection wound in right anterior position nearly healed and clean.  No signs of infection.    Assessment:  Probable hemorrhoidal disease status post excision  Wound healing without complication    Plan:  Continue local wound care.  Office visit 6 weeks    "

## 2018-05-08 ENCOUNTER — OFFICE VISIT (OUTPATIENT)
Dept: SURGERY | Facility: CLINIC | Age: 38
End: 2018-05-08
Payer: COMMERCIAL

## 2018-05-08 VITALS
HEART RATE: 76 BPM | DIASTOLIC BLOOD PRESSURE: 99 MMHG | HEIGHT: 72 IN | SYSTOLIC BLOOD PRESSURE: 177 MMHG | WEIGHT: 315 LBS | BODY MASS INDEX: 42.66 KG/M2

## 2018-05-08 DIAGNOSIS — K62.9 ANAL LESION: Primary | ICD-10-CM

## 2018-05-08 PROCEDURE — 3080F DIAST BP >= 90 MM HG: CPT | Mod: CPTII,S$GLB,, | Performed by: COLON & RECTAL SURGERY

## 2018-05-08 PROCEDURE — 3077F SYST BP >= 140 MM HG: CPT | Mod: CPTII,S$GLB,, | Performed by: COLON & RECTAL SURGERY

## 2018-05-08 PROCEDURE — 99999 PR PBB SHADOW E&M-EST. PATIENT-LVL III: CPT | Mod: PBBFAC,,, | Performed by: COLON & RECTAL SURGERY

## 2018-05-08 PROCEDURE — 3008F BODY MASS INDEX DOCD: CPT | Mod: CPTII,S$GLB,, | Performed by: COLON & RECTAL SURGERY

## 2018-05-08 PROCEDURE — 99213 OFFICE O/P EST LOW 20 MIN: CPT | Mod: S$GLB,,, | Performed by: COLON & RECTAL SURGERY

## 2018-05-08 NOTE — PROGRESS NOTES
CRS Office Visit History and Physical    Referring Md:   No referring provider defined for this encounter.    SUBJECTIVE:     Chief Complaint: Blood on tissue paper after wiping    History of Present Illness:  Denny Sullivan is a 37 y.o. male with history of anal lesion s/p excision 3/9/2018.  Feels well.  He states he is having some blood on the tissue paper after he wipes after a bowel movement.  He states it does not happen often, maybe once every 2 weeks or so.    Last Colonoscopy: Has not had a colonoscopy previously.      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 30 tablet 5    carvedilol (COREG) 25 MG tablet Take 1 tablet (25 mg total) by mouth 2 (two) times daily. 60 tablet 5    levocetirizine (XYZAL) 5 MG tablet Take 1 tablet (5 mg total) by mouth every evening. 30 tablet 5    metFORMIN (GLUCOPHAGE-XR) 500 MG 24 hr tablet Take 3 tablets (1,500 mg total) by mouth before dinner. 90 tablet 5    montelukast (SINGULAIR) 10 mg tablet Take 1  tablet (10 mg total) by mouth every evening. 30 tablet 5    multivitamin capsule Take 1 capsule by mouth 2 (two) times daily.       ranitidine (ZANTAC) 300 MG tablet Take 1 tablet (300 mg total) by mouth once daily. 30 tablet 5    rosuvastatin (CRESTOR) 10 MG tablet Take 1 tablet (10 mg total) by mouth once daily. 30 tablet 5    senna-docusate 8.6-50 mg (SENNA WITH DOCUSATE SODIUM) 8.6-50 mg per tablet Take 1 tablet by mouth once daily.       No current facility-administered medications on file prior to visit.        Family History   Problem Relation Age of Onset    Hypertension Mother     Diabetes Mother     Diabetes Father     Hypertension Father     Asthma Brother        Social History   Substance Use Topics    Smoking status: Former Smoker     Types: Cigars     Quit date: 10/4/2016    Smokeless tobacco: Never Used    Alcohol use Yes      Comment: Occasional wine, beer, and liqor        Review of Systems:  ROS:  GENERAL: No fever, chills, fatigability or weight loss.  Integument:  No rashes, redness, icterus  CHEST: Denies ASKEW, cyanosis, wheezing, cough and sputum production.  CARDIOVASCULAR: Denies chest pain, PND, orthopnea or reduced exercise tolerance.  GI:  Denies abd pain, dysphagia, nausea, vomiting, no hematemesis, no rectal pain  : Denies burning on urination, no hematuria, no bacteriuria  MSK:  No deformities, swelling, joint pain swelling  Neurologic:  No HAs, seizures, weakness, paresthesias, gait problems      OBJECTIVE:     Vital Signs (Most Recent)  Vitals:    05/08/18 1348   BP: (!) 177/99   Pulse: 76     Physical Exam:  General: Black or  male in no distress   Rectal  Inspection wound in right anterior position nearly healed and clean.  No signs of infection.     Anorectal:   Inspection: Immature granulation tissue, chemically cauterized with silver nitrate stick  Extremities: Warm dry and intact.  NO CCE  Neuro: alert and oriented x 4.  Moves all extremities.          ASSESSMENT/PLAN:     Some immature granulation tissue noted on examination, treated with silver nitrate stick    Plan:  Follow-up JO Lama  General Surgery PGY4    I have personally obtained a history and performed a physical exam with and independent to my resident and discussed the findings and plan with the patient.  I agree with the above findings and plan with the following exceptions:  None    H, Robert Mathias MD, FACS, FASCRS  Staff Surgeon  Dept of Colon and Rectal Surgery  [facility] [place], [place]

## 2018-05-15 NOTE — PROGRESS NOTES
CRS Office Visit History and Physical     Referring Md:   No referring provider defined for this encounter.     SUBJECTIVE:      Chief Complaint: Blood on tissue paper after wiping     History of Present Illness:  Denny Sullivan is a 37 y.o. male with history of anal lesion s/p excision 3/9/2018.  Feels well.  He states he is having some blood on the tissue paper after he wipes after a bowel movement.  He states it does not happen often, maybe once every 2 weeks or so.     Last Colonoscopy: Has not had a colonoscopy previously.             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 30 tablet 5    carvedilol (COREG) 25 MG tablet Take 1 tablet (25 mg total) by mouth 2 (two) times daily. 60 tablet 5    levocetirizine (XYZAL) 5 MG tablet Take 1 tablet (5 mg total) by mouth every evening. 30 tablet 5    metFORMIN (GLUCOPHAGE-XR) 500 MG 24 hr tablet Take 3 tablets (1,500 mg total) by mouth before dinner. 90  tablet 5    montelukast (SINGULAIR) 10 mg tablet Take 1 tablet (10 mg total) by mouth every evening. 30 tablet 5    multivitamin capsule Take 1 capsule by mouth 2 (two) times daily.         ranitidine (ZANTAC) 300 MG tablet Take 1 tablet (300 mg total) by mouth once daily. 30 tablet 5    rosuvastatin (CRESTOR) 10 MG tablet Take 1 tablet (10 mg total) by mouth once daily. 30 tablet 5    senna-docusate 8.6-50 mg (SENNA WITH DOCUSATE SODIUM) 8.6-50 mg per tablet Take 1 tablet by mouth once daily.          No current facility-administered medications on file prior to visit.                Family History   Problem Relation Age of Onset    Hypertension Mother      Diabetes Mother      Diabetes Father      Hypertension Father      Asthma Brother                   Social History   Substance Use Topics    Smoking status: Former Smoker       Types: Cigars       Quit date: 10/4/2016    Smokeless tobacco: Never Used    Alcohol use Yes          Comment: Occasional wine, beer, and liqor         Review of Systems:  ROS:  GENERAL: No fever, chills, fatigability or weight loss.  Integument:  No rashes, redness, icterus  CHEST: Denies ASKEW, cyanosis, wheezing, cough and sputum production.  CARDIOVASCULAR: Denies chest pain, PND, orthopnea or reduced exercise tolerance.  GI:  Denies abd pain, dysphagia, nausea, vomiting, no hematemesis, no rectal pain  : Denies burning on urination, no hematuria, no bacteriuria  MSK:  No deformities, swelling, joint pain swelling  Neurologic:  No HAs, seizures, weakness, paresthesias, gait problems        OBJECTIVE:      Vital Signs (Most Recent)      Vitals:     05/08/18 1348   BP: (!) 177/99   Pulse: 76      Physical Exam:  General: Black or  male in no distress   Rectal  Inspection wound in right anterior position nearly healed and clean.  No signs of infection.     Anorectal:   Inspection: Immature granulation tissue, chemically cauterized with silver nitrate  stick  Extremities: Warm dry and intact.  NO CCE  Neuro: alert and oriented x 4.  Moves all extremities.           ASSESSMENT/PLAN:      Some immature granulation tissue noted on examination, treated with silver nitrate stick     Plan:  Follow-up PRN     Abigail Lama  General Surgery PGY4     I have personally obtained a history and performed a physical exam with and independent to my resident and discussed the findings and plan with the patient.  I agree with the above findings and plan with the following exceptions:  None     H, Robert Mathias MD, FACS, FASCRS  Staff Surgeon  Dept of Colon and Rectal Surgery  [facility] [place], [place]

## 2018-12-03 ENCOUNTER — PATIENT MESSAGE (OUTPATIENT)
Dept: SURGERY | Facility: CLINIC | Age: 38
End: 2018-12-03

## 2018-12-29 ENCOUNTER — PATIENT MESSAGE (OUTPATIENT)
Dept: SURGERY | Facility: CLINIC | Age: 38
End: 2018-12-29

## 2019-03-14 ENCOUNTER — OFFICE VISIT (OUTPATIENT)
Dept: URGENT CARE | Facility: CLINIC | Age: 39
End: 2019-03-14
Payer: COMMERCIAL

## 2019-03-14 VITALS
RESPIRATION RATE: 18 BRPM | HEIGHT: 71 IN | TEMPERATURE: 98 F | HEART RATE: 90 BPM | SYSTOLIC BLOOD PRESSURE: 144 MMHG | OXYGEN SATURATION: 98 % | BODY MASS INDEX: 44.1 KG/M2 | WEIGHT: 315 LBS | DIASTOLIC BLOOD PRESSURE: 100 MMHG

## 2019-03-14 DIAGNOSIS — R35.0 URINE FREQUENCY: Primary | ICD-10-CM

## 2019-03-14 DIAGNOSIS — N30.00 ACUTE CYSTITIS WITHOUT HEMATURIA: ICD-10-CM

## 2019-03-14 LAB
BILIRUB UR QL STRIP: NEGATIVE
GLUCOSE UR QL STRIP: NEGATIVE
KETONES UR QL STRIP: NEGATIVE
LEUKOCYTE ESTERASE UR QL STRIP: POSITIVE
PH, POC UA: 7.5 (ref 5–8)
POC BLOOD, URINE: NEGATIVE
POC NITRATES, URINE: NEGATIVE
PROT UR QL STRIP: POSITIVE
SP GR UR STRIP: 1.01 (ref 1–1.03)
UROBILINOGEN UR STRIP-ACNC: POSITIVE (ref 0.3–2.2)

## 2019-03-14 PROCEDURE — 87088 URINE BACTERIA CULTURE: CPT

## 2019-03-14 PROCEDURE — 87077 CULTURE AEROBIC IDENTIFY: CPT

## 2019-03-14 PROCEDURE — 99000 SPECIMEN HANDLING OFFICE-LAB: CPT | Mod: S$GLB,,, | Performed by: PHYSICIAN ASSISTANT

## 2019-03-14 PROCEDURE — 3008F PR BODY MASS INDEX (BMI) DOCUMENTED: ICD-10-PCS | Mod: CPTII,S$GLB,, | Performed by: PHYSICIAN ASSISTANT

## 2019-03-14 PROCEDURE — 81003 POCT URINALYSIS, DIPSTICK, AUTOMATED, W/O SCOPE: ICD-10-PCS | Mod: QW,S$GLB,, | Performed by: PHYSICIAN ASSISTANT

## 2019-03-14 PROCEDURE — 3080F PR MOST RECENT DIASTOLIC BLOOD PRESSURE >= 90 MM HG: ICD-10-PCS | Mod: CPTII,S$GLB,, | Performed by: PHYSICIAN ASSISTANT

## 2019-03-14 PROCEDURE — 3077F SYST BP >= 140 MM HG: CPT | Mod: CPTII,S$GLB,, | Performed by: PHYSICIAN ASSISTANT

## 2019-03-14 PROCEDURE — 3080F DIAST BP >= 90 MM HG: CPT | Mod: CPTII,S$GLB,, | Performed by: PHYSICIAN ASSISTANT

## 2019-03-14 PROCEDURE — 87086 URINE CULTURE/COLONY COUNT: CPT

## 2019-03-14 PROCEDURE — 99213 PR OFFICE/OUTPT VISIT, EST, LEVL III, 20-29 MIN: ICD-10-PCS | Mod: 25,S$GLB,, | Performed by: PHYSICIAN ASSISTANT

## 2019-03-14 PROCEDURE — 87186 SC STD MICRODIL/AGAR DIL: CPT

## 2019-03-14 PROCEDURE — 81003 URINALYSIS AUTO W/O SCOPE: CPT | Mod: QW,S$GLB,, | Performed by: PHYSICIAN ASSISTANT

## 2019-03-14 PROCEDURE — 3008F BODY MASS INDEX DOCD: CPT | Mod: CPTII,S$GLB,, | Performed by: PHYSICIAN ASSISTANT

## 2019-03-14 PROCEDURE — 99213 OFFICE O/P EST LOW 20 MIN: CPT | Mod: 25,S$GLB,, | Performed by: PHYSICIAN ASSISTANT

## 2019-03-14 PROCEDURE — 3077F PR MOST RECENT SYSTOLIC BLOOD PRESSURE >= 140 MM HG: ICD-10-PCS | Mod: CPTII,S$GLB,, | Performed by: PHYSICIAN ASSISTANT

## 2019-03-14 PROCEDURE — 99000 PR SPECIMEN HANDLING,DR OFF->LAB: ICD-10-PCS | Mod: S$GLB,,, | Performed by: PHYSICIAN ASSISTANT

## 2019-03-14 RX ORDER — SULFAMETHOXAZOLE AND TRIMETHOPRIM 400; 80 MG/1; MG/1
1 TABLET ORAL 2 TIMES DAILY
Qty: 6 TABLET | Refills: 0 | Status: SHIPPED | OUTPATIENT
Start: 2019-03-14 | End: 2019-03-17

## 2019-03-14 NOTE — PATIENT INSTRUCTIONS
Understanding Urinary Tract Infections (UTIs)  Most UTIs are caused by bacteria, although they may also be caused by viruses or fungi. Bacteria from the bowel are the most common source of infection. The infection may start because of any of the following:  · Sexual activity. During sex, bacteria can travel from the penis, vagina, or rectum into the urethra.   · Bacteria on the skin outside the rectum may travel into the urethra. This is more common in women since the rectum and urethra are closer to each other than in men. Wiping from front to back after using the toilet and keeping the area clean can help prevent germs from getting to the urethra.  · Blockage of urine flow through the urinary tract. If urine sits too long, germs may start to grow out of control.      Parts of the urinary tract  The infection can occur in any part of the urinary tract.  · The kidneys collect and store urine.  · The ureters carry urine from the kidneys to the bladder.  · The bladder holds urine until you are ready to let it out.  · The urethra carries urine from the bladder out of the body. It is shorter in women, so bacteria can move through it more easily. The urethra is longer in men, so a UTI is less likely to reach the bladder or kidneys in men.  Date Last Reviewed: 1/1/2017  © 4625-7922 [employer]. [address] [place] [address], [place] [address]. All rights reserved. This information is not intended as a substitute for professional medical care. Always follow your healthcare professional's instructions.        Anatomy of the Male Urinary Tract  Your urinary tract helps to get rid of your bodys liquid waste. The kidneys constantly filter the blood to collect unneeded chemicals and water, making urine. Urine travels through the ureters to the bladder. The bladder fills with urine, holding it until youre ready to release it. Signals from the brain tell the sphincter (muscles around the opening of the bladder) when to  let urine flow out of the bladder. The urethra is the tube that carries urine from the bladder out of the body. In men, the prostate gland wraps around the urethra near the bladder.     Front view of male urinary tract.     Date Last Reviewed: 1/1/2017  © 1033-0501 [address] [place] [address]. All rights reserved. This information is not intended as a substitute for professional medical care. Always follow your healthcare professional's instructions.

## 2019-03-14 NOTE — PROGRESS NOTES
"Subjective:       Patient ID: Denny Sullivan is a 38 y.o. male.    Vitals:    03/14/19 0952   BP: (!) 144/100   Pulse: 90   Resp: 18   Temp: 97.6 °F (36.4 °C)   SpO2: 98%   Weight: (!) 187.8 kg (414 lb)   Height: 5' 11" (1.803 m)       Chief Complaint: Urinary Tract Infection    Urinary Tract Infection    The current episode started in the past 7 days. The problem occurs every urination. The problem has been unchanged. The maximum temperature recorded prior to his arrival was 101 - 101.9 F. Associated symptoms include chills, frequency, nausea and urgency. Pertinent negatives include no vomiting or rash. He has tried nothing for the symptoms. His past medical history is significant for hypertension.     Review of Systems   Constitution: Positive for chills. Negative for fever.   HENT: Negative for sore throat.    Eyes: Negative for blurred vision.   Cardiovascular: Negative for chest pain.   Respiratory: Negative for shortness of breath.    Skin: Negative for rash.   Musculoskeletal: Negative for back pain and joint pain.   Gastrointestinal: Positive for nausea. Negative for abdominal pain, diarrhea and vomiting.   Genitourinary: Positive for frequency and urgency.   Neurological: Negative for headaches.   Psychiatric/Behavioral: The patient is not nervous/anxious.        Patient comes in after receiving diagnosis of the flu and taking Xofluza yesterday. Today he experienced tingling with urination and an increase in frequency of about 2x/hour, which is unusual for him. Denies any burning with urination, fevers, chills, or body aches. He states he is feeling much from the flu sx he had as well.  Objective:       Results for orders placed or performed in visit on 03/14/19   POCT Urinalysis, Dipstick, Automated, W/O Scope   Result Value Ref Range    POC Blood, Urine Negative Negative    POC Bilirubin, Urine Negative Negative    POC Urobilinogen, Urine Positive 0.3 - 2.2    POC Ketones, Urine Negative Negative    " POC Protein, Urine Positive (A) Negative    POC Nitrates, Urine Negative Negative    POC Glucose, Urine Negative Negative    pH, UA 7.5 5 - 8    POC Specific Gravity, Urine 1.010 1.003 - 1.029    POC Leukocytes, Urine Positive (A) Negative       Physical Exam   Constitutional: He is oriented to person, place, and time. He appears well-developed and well-nourished. He is cooperative.  Non-toxic appearance. He does not appear ill. No distress.   HENT:   Head: Normocephalic and atraumatic.   Right Ear: Hearing, tympanic membrane, external ear and ear canal normal.   Left Ear: Hearing, tympanic membrane, external ear and ear canal normal.   Nose: Nose normal. No mucosal edema, rhinorrhea or nasal deformity. No epistaxis. Right sinus exhibits no maxillary sinus tenderness and no frontal sinus tenderness. Left sinus exhibits no maxillary sinus tenderness and no frontal sinus tenderness.   Mouth/Throat: Uvula is midline, oropharynx is clear and moist and mucous membranes are normal. No trismus in the jaw. Normal dentition. No uvula swelling. No posterior oropharyngeal erythema.   Eyes: Conjunctivae and lids are normal. No scleral icterus.   Sclera clear bilat   Neck: Trachea normal, normal range of motion, full passive range of motion without pain and phonation normal. Neck supple.   Cardiovascular: Normal rate, regular rhythm, normal heart sounds, intact distal pulses and normal pulses.   Pulmonary/Chest: Effort normal and breath sounds normal. No respiratory distress.   Abdominal: Soft. Normal appearance and bowel sounds are normal. He exhibits no distension. There is no tenderness. There is no CVA tenderness.   Musculoskeletal: Normal range of motion. He exhibits no edema or deformity.   Neurological: He is alert and oriented to person, place, and time. He has normal reflexes. He exhibits normal muscle tone. Coordination normal.   Skin: Skin is warm, dry and intact. He is not diaphoretic. No pallor.   Psychiatric: He  has a normal mood and affect. His speech is normal and behavior is normal. Judgment and thought content normal. Cognition and memory are normal.   Nursing note and vitals reviewed.      Assessment:       1. Urine frequency    2. Acute cystitis without hematuria        Plan:       Denny was seen today for urinary tract infection.    Diagnoses and all orders for this visit:    Urine frequency  -     POCT Urinalysis, Dipstick, Automated, W/O Scope    Acute cystitis without hematuria  -     sulfamethoxazole-trimethoprim 400-80mg (BACTRIM,SEPTRA) 400-80 mg per tablet; Take 1 tablet by mouth 2 (two) times daily. for 3 days  -     Culture, Urine      Patient Instructions       Understanding Urinary Tract Infections (UTIs)  Most UTIs are caused by bacteria, although they may also be caused by viruses or fungi. Bacteria from the bowel are the most common source of infection. The infection may start because of any of the following:  · Sexual activity. During sex, bacteria can travel from the penis, vagina, or rectum into the urethra.   · Bacteria on the skin outside the rectum may travel into the urethra. This is more common in women since the rectum and urethra are closer to each other than in men. Wiping from front to back after using the toilet and keeping the area clean can help prevent germs from getting to the urethra.  · Blockage of urine flow through the urinary tract. If urine sits too long, germs may start to grow out of control.      Parts of the urinary tract  The infection can occur in any part of the urinary tract.  · The kidneys collect and store urine.  · The ureters carry urine from the kidneys to the bladder.  · The bladder holds urine until you are ready to let it out.  · The urethra carries urine from the bladder out of the body. It is shorter in women, so bacteria can move through it more easily. The urethra is longer in men, so a UTI is less likely to reach the bladder or kidneys in men.  Date Last  Reviewed: 1/1/2017  © 5748-8479 [employer]. [address] [place] [address]. All rights reserved. This information is not intended as a substitute for professional medical care. Always follow your healthcare professional's instructions.        Anatomy of the Male Urinary Tract  Your urinary tract helps to get rid of your bodys liquid waste. The kidneys constantly filter the blood to collect unneeded chemicals and water, making urine. Urine travels through the ureters to the bladder. The bladder fills with urine, holding it until youre ready to release it. Signals from the brain tell the sphincter (muscles around the opening of the bladder) when to let urine flow out of the bladder. The urethra is the tube that carries urine from the bladder out of the body. In men, the prostate gland wraps around the urethra near the bladder.     Front view of male urinary tract.     Date Last Reviewed: 1/1/2017  © 8211-7469 [employer]. [address] [place] [address]. All rights reserved. This information is not intended as a substitute for professional medical care. Always follow your healthcare professional's instructions.

## 2019-03-16 ENCOUNTER — TELEPHONE (OUTPATIENT)
Dept: URGENT CARE | Facility: CLINIC | Age: 39
End: 2019-03-16

## 2019-03-16 LAB — BACTERIA UR CULT: NORMAL

## 2019-03-16 RX ORDER — NITROFURANTOIN 25; 75 MG/1; MG/1
100 CAPSULE ORAL 2 TIMES DAILY
Qty: 14 CAPSULE | Refills: 0 | Status: SHIPPED | OUTPATIENT
Start: 2019-03-16 | End: 2019-03-23

## 2019-03-16 NOTE — TELEPHONE ENCOUNTER
Spoke with pt about urine culture results- resistance to bactrim which he was given and many other abx. Pt reports symptoms have not improved. Pt was given macrobid and instructed to follow up with PCP if symptoms persist or consider the infection cleared if symptoms subside.

## 2019-07-25 DIAGNOSIS — I87.2 VENOUS INSUFFICIENCY: Primary | ICD-10-CM

## 2019-09-18 ENCOUNTER — OFFICE VISIT (OUTPATIENT)
Dept: VASCULAR SURGERY | Facility: CLINIC | Age: 39
End: 2019-09-18
Payer: COMMERCIAL

## 2019-09-18 ENCOUNTER — HOSPITAL ENCOUNTER (OUTPATIENT)
Dept: VASCULAR SURGERY | Facility: CLINIC | Age: 39
Discharge: HOME | End: 2019-09-18
Attending: SURGERY
Payer: COMMERCIAL

## 2019-09-18 VITALS
HEART RATE: 69 BPM | HEIGHT: 72 IN | TEMPERATURE: 98 F | BODY MASS INDEX: 42.66 KG/M2 | WEIGHT: 315 LBS | SYSTOLIC BLOOD PRESSURE: 140 MMHG | DIASTOLIC BLOOD PRESSURE: 84 MMHG

## 2019-09-18 DIAGNOSIS — I83.11 VARICOSE VEINS OF BOTH LOWER EXTREMITIES WITH INFLAMMATION: Primary | ICD-10-CM

## 2019-09-18 DIAGNOSIS — I87.2 VENOUS INSUFFICIENCY: ICD-10-CM

## 2019-09-18 DIAGNOSIS — I83.12 VARICOSE VEINS OF BOTH LOWER EXTREMITIES WITH INFLAMMATION: Primary | ICD-10-CM

## 2019-09-18 PROCEDURE — 3077F PR MOST RECENT SYSTOLIC BLOOD PRESSURE >= 140 MM HG: ICD-10-PCS | Mod: CPTII,S$GLB,, | Performed by: SURGERY

## 2019-09-18 PROCEDURE — 99204 PR OFFICE/OUTPT VISIT, NEW, LEVL IV, 45-59 MIN: ICD-10-PCS | Mod: S$GLB,,, | Performed by: SURGERY

## 2019-09-18 PROCEDURE — 99999 PR PBB SHADOW E&M-EST. PATIENT-LVL IV: CPT | Mod: PBBFAC,,, | Performed by: SURGERY

## 2019-09-18 PROCEDURE — 99204 OFFICE O/P NEW MOD 45 MIN: CPT | Mod: S$GLB,,, | Performed by: SURGERY

## 2019-09-18 PROCEDURE — 3008F BODY MASS INDEX DOCD: CPT | Mod: CPTII,S$GLB,, | Performed by: SURGERY

## 2019-09-18 PROCEDURE — 3077F SYST BP >= 140 MM HG: CPT | Mod: CPTII,S$GLB,, | Performed by: SURGERY

## 2019-09-18 PROCEDURE — 3079F DIAST BP 80-89 MM HG: CPT | Mod: CPTII,S$GLB,, | Performed by: SURGERY

## 2019-09-18 PROCEDURE — 3079F PR MOST RECENT DIASTOLIC BLOOD PRESSURE 80-89 MM HG: ICD-10-PCS | Mod: CPTII,S$GLB,, | Performed by: SURGERY

## 2019-09-18 PROCEDURE — 3008F PR BODY MASS INDEX (BMI) DOCUMENTED: ICD-10-PCS | Mod: CPTII,S$GLB,, | Performed by: SURGERY

## 2019-09-18 PROCEDURE — 93970 PR US DUPLEX, UPPER OR LOWER EXT VENOUS,COMPLETE BILAT: ICD-10-PCS | Mod: S$GLB,,, | Performed by: SURGERY

## 2019-09-18 PROCEDURE — 99999 PR PBB SHADOW E&M-EST. PATIENT-LVL IV: ICD-10-PCS | Mod: PBBFAC,,, | Performed by: SURGERY

## 2019-09-18 PROCEDURE — 93970 EXTREMITY STUDY: CPT | Mod: S$GLB,,, | Performed by: SURGERY

## 2019-09-18 NOTE — LETTER
September 18, 2019      Rosalinda Rodriguez MD  [address] [facility] [address]           [facility] - Vascular Surgery  [facility]  [place] [address]  Phone: [phone]  Fax: [phone]          Patient: Denny Sullivan   MR Number: [id number]   YOB: 1980   Date of Visit: 9/18/2019       Dear Dr. Rosalinda Rodriguez:    Thank you for referring Denny Sullivan to me for evaluation. Attached you will find relevant portions of my assessment and plan of care.    If you have questions, please do not hesitate to call me. I look forward to following Denny Sullivan along with you.    Sincerely,    Francisco Javier Aguero MD    Enclosure  CC:  No Recipients    If you would like to receive this communication electronically, please contact [email or web address] or [phone] to request more information on [employer] access.    For providers and/or their staff who would like to refer a patient to [facility], please contact us through our one-stop-shop provider referral line, [facility], at [phone].    If you feel you have received this communication in error or would no longer like to receive these types of communications, please e-mail [email or web address]

## 2019-09-18 NOTE — PATIENT INSTRUCTIONS
Low-Fat Cooking Tips  To eat less fat, you may need to learn some new ways to cook. But that doesn't mean you have to eat bland, boring food. And it doesn't mean cooking needs to take any more time. Here are some tips for cooking and seasoning foods with less fat.     Broil fish instead of frying it. And sprinkle on herbs to add flavor.    Try New Cooking Methods  · Broil, roast, bake, steam, or microwave fish, chicken, turkey, and meat.  · Remove skin from chicken and turkey and trim extra fat from meat before cooking.  · Sprinkle herbs on meat, chicken, and fish, and in soups.  · Cook in broth instead of fat.  · Use nonstick cooking sprays or nonstick pans.  · Steam or microwave vegetables without adding fat. Serve with herbs, lemon juice, vinegar, or fat-free butter-flavored powder.  · To flavor beans and rice, add chopped onions, garlic, and peppers.  · Chill soups and stews. Before reheating and serving, skim off the fat.  · When you add fat, use canola or olive oil instead of butter or lard.  Lighten Up Your Recipes  · In soups and sauces: Replace whole milk or cream with low-fat milk, evaporated fat-free milk, or nonfat dry milk.  · In puddings and other desserts: Replace whole milk or cream with low-fat milk or fat-free condensed milk.  · To make dips and toppings: Use low-fat or nonfat cottage cheese or sour cream.  · To make salad dressings: Use nonfat yogurt or low-fat buttermilk.  · In place of 1 whole egg in recipes: Use 2 egg whites or 1/4 cup egg substitute.  · In place of regular cheese: Use fat-free or reduced-fat cheese.  Date Last Reviewed: 5/11/2015  © 1591-3628 [employer]. [address] [place] [address], [place] [address]. All rights reserved. This information is not intended as a substitute for professional medical care. Always follow your healthcare professional's instructions.          Understanding Fat and Cholesterol  Too much cholesterol in your blood can lead to many problems  such as blocked arteries. This can lead to heart attack and stroke. One of the best ways to manage heart and blood vessel disease is to lower your blood cholesterol. Planning meals that are low in saturated fat and cholesterol helps reduce the level of cholesterol in your blood. Below are eating tips to help lower your blood cholesterol levels.  Eat less fat  A healthy goal is to have less than 25% to 35% of your daily calories come from fat. Instead of fats, eat more fruits, whole-grains, and vegetables. This also helps control your weight, and can even reduce your risk for some cancers. There are different kinds of fats in foods. Fats can be saturated, unsaturated, or trans fats. The best fats to choose are unsaturated fats. But fats are high in calories, so eat even unsaturated fats sparingly.  Limit foods high in saturated fats  Saturated fats come from animals and certain plants (such as coconut and palm). Eating too much saturated fat can raise your blood cholesterol levels and make your artery problems worse. Your goal is to eat less saturated fat. Below are some examples of foods that contain lots of saturated fat:  · Fatty cuts of meat (lamb, ham, beef)  · Many pastries, cakes, cookies, and candies  · Cream, ice cream, sour cream, cheese, and butter, and foods made with them  · Sauces made with butter or cream  · Salad dressings with saturated fats  · Foods that contain palm or coconut oil  Choose unsaturated fats  Unsaturated fats are usually liquid at room temperature. They are better choices for your heart than saturated fat. There are two types of unsaturated fats: polyunsaturated fat and monounsaturated fat. Aim to replace saturated fats with polyunsaturated or monounsaturated fats.  · Polyunsaturated fats are found in corn oil, safflower oil, sunflower oil, and other vegetable oils.  · Monounsaturated fats are found in olive oil, canola oil, and peanut oil. Some margarines and spreads are now made  with these oils, too. Avocados are also high in monounsaturated fat.  Of all fats, monounsaturated fats are the least harmful to your heart.  Avoid trans fats  Like saturated fats, trans fats have been linked to heart disease. Even a small amount can harm your health. Trans fats are found in liquid oils that have been changed to be solid at room temperature. Margarine, which is often made from vegetable oil, is one example. Vegetable shortening is another. Trans fats are often found in packaged goods. Check ingredients for the words hydrogenated or partially hydrogenated. They mean the foods contain trans fat.  What about triglycerides?  Triglycerides are a type of fat in your blood. Like cholesterol, high levels of triglycerides can lead to blocked arteries. High triglyceride levels can be reduced 20% to 50%  by limiting added sugars in your diet, susbstituting healthier fats for saturated and trans fats, getting more physical activity, and losing weight if your are overweight. You may also be advised to avoid or limi alcohol.    Reading food labels  Luckily, most foods now have labels giving you the facts about what youre eating. Reading food labels helps you make healthy choices. Look for the words highlighted below.  · Serving Size. This is the amount of food in 1 serving. If you eat larger portions, be sure to count more of everything: fat, calories, and cholesterol.  · Total Fat. Tells you how many grams (g) of fat are in 1 serving.  · Calories from Fat. This tells you the total number of calories from fat in 1 serving (there are 9 calories per gram of fat). Look for foods with the fewest calories from fat.  · Saturated Fat. Tells you how many grams (g) of saturated fat are in 1 serving.  · Trans Fat. Tells how many grams (g) of trans fat are in 1 serving.  · Cholesterol. Tells you how many milligrams (mg) of cholesterol are in 1 serving.  Date Last Reviewed: 5/11/2015  © 6240-7502 [employer] [address] [place] [address]. All rights reserved. This information is not intended as a substitute for professional medical care. Always follow your healthcare professional's instructions.

## 2019-09-18 NOTE — PROGRESS NOTES
Denny Sullivan  09/18/2019    HPI:  Patient is a 38 y.o. male h/o morbid obesity (BMI 56) and HTN, HLD, and PreDM  who is here today for evaluation of persistent leg discomfort secondary to significant superficial venous insufficiency.    Had L leg pain following cellulitis in July starting after an insect bite (strep) to the ankle completed Abx at the end of July. Still has intermittent leg pain at the site where he had an infection. Was seen by PCP who saw varicose veins and prescribed stockings. He has had varicose veins that he noticed for years, never thought much of it as he has multiple family members with varicose veins.   On ROS he does report noticing some skin changes at the L ankle with a darker pigmentation and heaviness/soreness of the BLLE at the end of the work day.  He has done knee-high compression with Rx provided by PCP for the past month, he states his symptoms of swelling and soreness have been largely relieved by this  No history of ulceration    no MI/stroke  Tobacco use: Prior cigar smoker stopped 3 years ago  History of DVT/PE: none    Past Medical History:   Diagnosis Date    Adult general medical examination 04/17/2017    Benign hypertensive heart disease without congestive heart failure     Familial hypercholesterolemia     Hyperlipidemia     Hypertension     Hypertriglyceridemia     Metabolic syndrome     Pre-Diabetic    Metabolic syndrome X     Migraine headache     Seizure      Past Surgical History:   Procedure Laterality Date    COLON SURGERY      EGD (ESOPHAGOGASTRODUODENOSCOPY) N/A 6/25/2013    Performed by [facility]iel [facility]ario [facility] at [facility] (2ND FLR)    EXCISION-LESION - ANAL N/A 12/22/2017    Performed by [facility]CE Mathias MD at [facility] OR 2ND FLR    EXCISION-LESION-ANUS N/A 3/9/2018    Performed by [facility] MD at [facility] OR 2ND FLR    LUNG SURGERY      as a child, 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    RENAL BIOPSY      to confirm kidney function had  returned     Family History   Problem Relation Age of Onset    Hypertension Mother     Diabetes Mother     Diabetes Father     Hypertension Father     Asthma Brother      Social History     Socioeconomic History    Marital status:      Spouse name: Tiffanie    Number of children: 1    Years of education: Not on file    Highest education level: Not on file   Occupational History    Not on file   Social Needs    Financial resource strain: Not on file    Food insecurity:     Worry: Not on file     Inability: Not on file    Transportation needs:     Medical: Not on file     Non-medical: Not on file   Tobacco Use    Smoking status: Former Smoker     Types: Cigars     Last attempt to quit: 10/4/2016     Years since quittin.9    Smokeless tobacco: Never Used   Substance and Sexual Activity    Alcohol use: Yes     Comment: Occasional wine, beer, and liqor    Drug use: No    Sexual activity: Yes     Partners: Female   Lifestyle    Physical activity:     Days per week: Not on file     Minutes per session: Not on file    Stress: Not on file   Relationships    Social connections:     Talks on phone: Not on file     Gets together: Not on file     Attends Anabaptism service: Not on file     Active member of club or organization: Not on file     Attends meetings of clubs or organizations: Not on file     Relationship status: Not on file   Other Topics Concern    Not on file   Social History Narrative    Not on file       Current Outpatient Medications:     amLODIPine (NORVASC) 10 MG tablet, Take 1 tablet (10 mg total) by mouth once daily., Disp: 30 tablet, Rfl: 5    amoxicillin-clavulanate 875-125mg (AUGMENTIN) 875-125 mg per tablet, Take 1 tablet by mouth 2 (two) times daily. for 10 days, Disp: 20 tablet, Rfl: 0    carvedilol (COREG) 25 MG tablet, Take 1 tablet (25 mg total) by mouth 2 (two) times daily., Disp: 60 tablet, Rfl: 5    cyclobenzaprine (FLEXERIL) 10 MG tablet, One tab po qhs prn  spasm, Disp: 30 tablet, Rfl: 2    losartan-hydrochlorothiazide 50-12.5 mg (HYZAAR) 50-12.5 mg per tablet, Take 1 tablet by mouth once daily., Disp: 30 tablet, Rfl: 5    metFORMIN (GLUCOPHAGE-XR) 500 MG 24 hr tablet, Take 3 tablets (1,500 mg total) by mouth before dinner., Disp: 90 tablet, Rfl: 5    montelukast (SINGULAIR) 10 mg tablet, Take 1 tablet (10 mg total) by mouth every evening., Disp: 30 tablet, Rfl: 5    multivitamin capsule, Take 1 capsule by mouth 2 (two) times daily. OTC, Disp: , Rfl:     ranitidine (ZANTAC) 300 MG tablet, Take 1 tablet (300 mg total) by mouth once daily., Disp: 30 tablet, Rfl: 5    rosuvastatin (CRESTOR) 20 MG tablet, Take 1 tablet (20 mg total) by mouth once daily., Disp: 30 tablet, Rfl: 3    semaglutide (OZEMPIC) 1 mg/dose (2 mg/1.5 mL) PnIj, 1mg sq once a week, Disp: 4 Syringe, Rfl: 5    REVIEW OF SYSTEMS:  General: negative; ENT: negative; Allergy and Immunology: negative; Hematological and Lymphatic: Negative; Endocrine: negative; Respiratory: no cough, shortness of breath, or wheezing; Cardiovascular: no chest pain or dyspnea on exertion; Gastrointestinal: no abdominal pain/back, change in bowel habits, or bloody stools; Genito-Urinary: no dysuria, trouble voiding, or hematuria; Musculoskeletal: Leg discomfort secondary to chronic venous insufficiency; Neurological: no TIA or stroke symptoms; Psychiatric: no nervousness, anxiety or depression.    PHYSICAL EXAM:       Vitals:    09/18/19 1020   BP: (!) 140/84   Pulse: 69   Temp: 98.3 °F (36.8 °C)     Pulse: 69  Temp: 98.3 °F (36.8 °C)    General appearance:  Alert, well-appearing, and in no distress.  Oriented to person, place, and time, obese   Neurological: Normal speech, no focal findings noted; CN II - XII grossly intact           Musculoskeletal: Digits/nail without cyanosis/clubbing.  Normal muscle strength/tone.                 Neck: Supple, no significant adenopathy; thyroid is not enlarged                  Carotid  bruits cannot be auscultated                Chest:  Clear to auscultation, no wheezes, rales or rhonchi, symmetric air entry     No use of accessory muscles             Cardiac: Normal rate and regular rhythm, S1 and S2 normal; PMI non-displaced          Abdomen: Soft, nontender, nondistended, no masses or organomegaly     No rebound tenderness noted; bowel sounds normal          Extremities:   2+ femoral pulses bilaterally     + pedal pulses palpable.     Edema/leg swelling:  Bilateral ankles       Hyperpigmentation: to the left ankle      No ulceration or cellulitis     LAB RESULTS:  Lab Results   Component Value Date    K 4.5 11/17/2017    K 4.3 11/15/2017    K 3.9 09/06/2013    CREATININE 0.87 11/17/2017    CREATININE 1.1 11/15/2017    CREATININE 0.9 09/06/2013     Lab Results   Component Value Date    WBC 7.74 11/15/2017    WBC 12.00 (H) 06/28/2013    WBC 10.41 06/27/2013    HCT 43.9 11/15/2017    HCT 38.2 (L) 06/28/2013    HCT 39.0 (L) 06/27/2013     11/15/2017     06/28/2013     06/27/2013     Lab Results   Component Value Date    HGBA1C 5.4 11/17/2017    HGBA1C 5.5 06/23/2013     IMAGING:  Venous U/S:  R GSV: 7.8mm  L GSV: 7.6mm    R SSV: 4.6mm  L SSV: no reflux  No DVT    IMP/PLAN:  38 y.o. male with Chronic venous insufficiency improved with knee high compression stockings for the past month. Assume he will improve with better compression. No ulceration    Weight loss  Rx for thigh high 30-40mmHg compression stockings provided today in clinic   RTC PRCARLINE Aguero MD FACS  Vascular/Endovascular Surgery

## 2020-06-12 PROBLEM — Z01.84 ENCOUNTER FOR ANTIBODY RESPONSE EXAMINATION: Status: ACTIVE | Noted: 2020-06-12

## 2021-01-02 ENCOUNTER — CLINICAL SUPPORT (OUTPATIENT)
Dept: URGENT CARE | Facility: CLINIC | Age: 41
End: 2021-01-02
Payer: COMMERCIAL

## 2021-01-02 DIAGNOSIS — Z11.59 SCREENING FOR VIRAL DISEASE: Primary | ICD-10-CM

## 2021-01-02 LAB
CTP QC/QA: YES
SARS-COV-2 RDRP RESP QL NAA+PROBE: NEGATIVE

## 2021-01-02 PROCEDURE — U0002 COVID-19 LAB TEST NON-CDC: HCPCS | Mod: QW,S$GLB,, | Performed by: NURSE PRACTITIONER

## 2021-01-02 PROCEDURE — U0002: ICD-10-PCS | Mod: QW,S$GLB,, | Performed by: NURSE PRACTITIONER

## 2021-07-29 ENCOUNTER — CLINICAL SUPPORT (OUTPATIENT)
Dept: URGENT CARE | Facility: CLINIC | Age: 41
End: 2021-07-29
Payer: COMMERCIAL

## 2021-07-29 DIAGNOSIS — Z20.822 CONTACT WITH AND (SUSPECTED) EXPOSURE TO COVID-19: Primary | ICD-10-CM

## 2021-07-29 LAB
CTP QC/QA: YES
SARS-COV-2 RDRP RESP QL NAA+PROBE: NEGATIVE

## 2021-07-29 PROCEDURE — U0002: ICD-10-PCS | Mod: QW,S$GLB,, | Performed by: PHYSICIAN ASSISTANT

## 2021-07-29 PROCEDURE — 99211 OFF/OP EST MAY X REQ PHY/QHP: CPT | Mod: S$GLB,,, | Performed by: PHYSICIAN ASSISTANT

## 2021-07-29 PROCEDURE — 99211 PR OFFICE/OUTPT VISIT, EST, LEVL I: ICD-10-PCS | Mod: S$GLB,,, | Performed by: PHYSICIAN ASSISTANT

## 2021-07-29 PROCEDURE — U0002 COVID-19 LAB TEST NON-CDC: HCPCS | Mod: QW,S$GLB,, | Performed by: PHYSICIAN ASSISTANT

## 2021-10-12 ENCOUNTER — OFFICE VISIT (OUTPATIENT)
Dept: URGENT CARE | Facility: CLINIC | Age: 41
End: 2021-10-12
Payer: COMMERCIAL

## 2021-10-12 VITALS
DIASTOLIC BLOOD PRESSURE: 98 MMHG | SYSTOLIC BLOOD PRESSURE: 146 MMHG | TEMPERATURE: 102 F | HEART RATE: 117 BPM | OXYGEN SATURATION: 95 % | WEIGHT: 315 LBS | BODY MASS INDEX: 42.66 KG/M2 | HEIGHT: 72 IN | RESPIRATION RATE: 16 BRPM

## 2021-10-12 DIAGNOSIS — J18.9 PNEUMONIA DUE TO INFECTIOUS ORGANISM, UNSPECIFIED LATERALITY, UNSPECIFIED PART OF LUNG: Primary | ICD-10-CM

## 2021-10-12 DIAGNOSIS — R50.9 FEVER, UNSPECIFIED FEVER CAUSE: ICD-10-CM

## 2021-10-12 PROBLEM — Z20.822 CONTACT WITH AND (SUSPECTED) EXPOSURE TO COVID-19: Status: ACTIVE | Noted: 2021-08-05

## 2021-10-12 PROBLEM — E66.01 CLASS 3 SEVERE OBESITY WITH BODY MASS INDEX (BMI) OF 50.0 TO 59.9 IN ADULT: Status: ACTIVE | Noted: 2021-03-17

## 2021-10-12 PROBLEM — E11.9 DIABETES MELLITUS: Status: ACTIVE | Noted: 2017-02-17

## 2021-10-12 PROBLEM — J30.9 ALLERGIC RHINITIS: Status: ACTIVE | Noted: 2021-10-12

## 2021-10-12 PROBLEM — G47.33 OBSTRUCTIVE SLEEP APNEA SYNDROME: Status: ACTIVE | Noted: 2021-03-17

## 2021-10-12 PROBLEM — I10 BENIGN ESSENTIAL HYPERTENSION: Status: ACTIVE | Noted: 2021-03-17

## 2021-10-12 PROBLEM — E11.9 TYPE 2 DIABETES MELLITUS, WITHOUT LONG-TERM CURRENT USE OF INSULIN: Status: ACTIVE | Noted: 2021-03-17

## 2021-10-12 LAB
CTP QC/QA: YES
SARS-COV-2 RDRP RESP QL NAA+PROBE: NEGATIVE

## 2021-10-12 PROCEDURE — 3008F PR BODY MASS INDEX (BMI) DOCUMENTED: ICD-10-PCS | Mod: CPTII,S$GLB,, | Performed by: PHYSICIAN ASSISTANT

## 2021-10-12 PROCEDURE — 1159F PR MEDICATION LIST DOCUMENTED IN MEDICAL RECORD: ICD-10-PCS | Mod: CPTII,S$GLB,, | Performed by: PHYSICIAN ASSISTANT

## 2021-10-12 PROCEDURE — 3077F SYST BP >= 140 MM HG: CPT | Mod: CPTII,S$GLB,, | Performed by: PHYSICIAN ASSISTANT

## 2021-10-12 PROCEDURE — 3066F PR DOCUMENTATION OF TREATMENT FOR NEPHROPATHY: ICD-10-PCS | Mod: CPTII,S$GLB,, | Performed by: PHYSICIAN ASSISTANT

## 2021-10-12 PROCEDURE — 3008F BODY MASS INDEX DOCD: CPT | Mod: CPTII,S$GLB,, | Performed by: PHYSICIAN ASSISTANT

## 2021-10-12 PROCEDURE — 99214 OFFICE O/P EST MOD 30 MIN: CPT | Mod: S$GLB,,, | Performed by: PHYSICIAN ASSISTANT

## 2021-10-12 PROCEDURE — 99214 PR OFFICE/OUTPT VISIT, EST, LEVL IV, 30-39 MIN: ICD-10-PCS | Mod: S$GLB,,, | Performed by: PHYSICIAN ASSISTANT

## 2021-10-12 PROCEDURE — 71046 X-RAY EXAM CHEST 2 VIEWS: CPT | Mod: S$GLB,,, | Performed by: RADIOLOGY

## 2021-10-12 PROCEDURE — 3080F DIAST BP >= 90 MM HG: CPT | Mod: CPTII,S$GLB,, | Performed by: PHYSICIAN ASSISTANT

## 2021-10-12 PROCEDURE — 3077F PR MOST RECENT SYSTOLIC BLOOD PRESSURE >= 140 MM HG: ICD-10-PCS | Mod: CPTII,S$GLB,, | Performed by: PHYSICIAN ASSISTANT

## 2021-10-12 PROCEDURE — 4010F ACE/ARB THERAPY RXD/TAKEN: CPT | Mod: CPTII,S$GLB,, | Performed by: PHYSICIAN ASSISTANT

## 2021-10-12 PROCEDURE — U0002 COVID-19 LAB TEST NON-CDC: HCPCS | Mod: QW,S$GLB,, | Performed by: PHYSICIAN ASSISTANT

## 2021-10-12 PROCEDURE — 4010F PR ACE/ARB THEARPY RXD/TAKEN: ICD-10-PCS | Mod: CPTII,S$GLB,, | Performed by: PHYSICIAN ASSISTANT

## 2021-10-12 PROCEDURE — 71046 XR CHEST PA AND LATERAL: ICD-10-PCS | Mod: S$GLB,,, | Performed by: RADIOLOGY

## 2021-10-12 PROCEDURE — U0002: ICD-10-PCS | Mod: QW,S$GLB,, | Performed by: PHYSICIAN ASSISTANT

## 2021-10-12 PROCEDURE — 1160F RVW MEDS BY RX/DR IN RCRD: CPT | Mod: CPTII,S$GLB,, | Performed by: PHYSICIAN ASSISTANT

## 2021-10-12 PROCEDURE — 1160F PR REVIEW ALL MEDS BY PRESCRIBER/CLIN PHARMACIST DOCUMENTED: ICD-10-PCS | Mod: CPTII,S$GLB,, | Performed by: PHYSICIAN ASSISTANT

## 2021-10-12 PROCEDURE — 3080F PR MOST RECENT DIASTOLIC BLOOD PRESSURE >= 90 MM HG: ICD-10-PCS | Mod: CPTII,S$GLB,, | Performed by: PHYSICIAN ASSISTANT

## 2021-10-12 PROCEDURE — 3066F NEPHROPATHY DOC TX: CPT | Mod: CPTII,S$GLB,, | Performed by: PHYSICIAN ASSISTANT

## 2021-10-12 PROCEDURE — 3061F NEG MICROALBUMINURIA REV: CPT | Mod: CPTII,S$GLB,, | Performed by: PHYSICIAN ASSISTANT

## 2021-10-12 PROCEDURE — 3061F PR NEG MICROALBUMINURIA RESULT DOCUMENTED/REVIEW: ICD-10-PCS | Mod: CPTII,S$GLB,, | Performed by: PHYSICIAN ASSISTANT

## 2021-10-12 PROCEDURE — 1159F MED LIST DOCD IN RCRD: CPT | Mod: CPTII,S$GLB,, | Performed by: PHYSICIAN ASSISTANT

## 2021-10-12 RX ORDER — ACETAMINOPHEN 500 MG
1000 TABLET ORAL
Status: COMPLETED | OUTPATIENT
Start: 2021-10-12 | End: 2021-10-12

## 2021-10-12 RX ORDER — BENZONATATE 100 MG/1
200 CAPSULE ORAL 3 TIMES DAILY PRN
Qty: 40 CAPSULE | Refills: 0 | Status: SHIPPED | OUTPATIENT
Start: 2021-10-12 | End: 2021-10-19

## 2021-10-12 RX ORDER — AMOXICILLIN AND CLAVULANATE POTASSIUM 875; 125 MG/1; MG/1
1 TABLET, FILM COATED ORAL 2 TIMES DAILY
Qty: 20 TABLET | Refills: 0 | Status: SHIPPED | OUTPATIENT
Start: 2021-10-12 | End: 2021-10-22

## 2021-10-12 RX ORDER — ALBUTEROL SULFATE 90 UG/1
2 AEROSOL, METERED RESPIRATORY (INHALATION) EVERY 4 HOURS PRN
Qty: 6.7 G | Refills: 0 | Status: SHIPPED | OUTPATIENT
Start: 2021-10-12 | End: 2022-10-27

## 2021-10-12 RX ORDER — AZITHROMYCIN 250 MG/1
TABLET, FILM COATED ORAL
Qty: 6 TABLET | Refills: 0 | Status: SHIPPED | OUTPATIENT
Start: 2021-10-12 | End: 2022-01-20

## 2021-10-12 RX ADMIN — Medication 1000 MG: at 06:10

## 2021-10-14 PROBLEM — K62.89 RECTAL MASS: Status: RESOLVED | Noted: 2017-11-15 | Resolved: 2021-10-14

## 2021-10-14 PROBLEM — E11.9 TYPE 2 DIABETES MELLITUS, WITHOUT LONG-TERM CURRENT USE OF INSULIN: Status: RESOLVED | Noted: 2021-03-17 | Resolved: 2021-10-14

## 2021-10-14 PROBLEM — E88.810 DYSMETABOLIC SYNDROME X: Status: ACTIVE | Noted: 2021-10-14

## 2021-10-14 PROBLEM — K62.9 ANAL LESION: Status: RESOLVED | Noted: 2018-03-09 | Resolved: 2021-10-14

## 2021-10-14 PROBLEM — E11.9 DIABETES MELLITUS: Status: RESOLVED | Noted: 2017-02-17 | Resolved: 2021-10-14

## 2021-10-14 PROBLEM — Z01.84 ENCOUNTER FOR ANTIBODY RESPONSE EXAMINATION: Status: RESOLVED | Noted: 2020-06-12 | Resolved: 2021-10-14

## 2021-10-14 PROBLEM — R68.89 DECREASED STRENGTH, ENDURANCE, AND MOBILITY: Status: RESOLVED | Noted: 2017-09-05 | Resolved: 2021-10-14

## 2021-10-14 PROBLEM — Z74.09 DECREASED STRENGTH, ENDURANCE, AND MOBILITY: Status: RESOLVED | Noted: 2017-09-05 | Resolved: 2021-10-14

## 2021-10-14 PROBLEM — R53.1 DECREASED STRENGTH, ENDURANCE, AND MOBILITY: Status: RESOLVED | Noted: 2017-09-05 | Resolved: 2021-10-14

## 2021-12-14 NOTE — TELEPHONE ENCOUNTER
----- Message from Samantha Gruber sent at 3/20/2018  9:23 AM CDT -----  Contact: pt#[phone]  Pt wants to know if he an return to work with some restrictions. Please call    Radiation Oncology Follow-Up Note    DATE OF VISIT: 12/14/21     DIAGNOSIS: very high risk prostate cancer (cT2b, Grade Group 5, PSA 26.8)     SITE TREATED: prostate, seminal vesicles, pelvic lymph nodes (80 Gy in 40 fractions to prostate/SVs, 60 Gy in 40 fractions to pelvic lymph nodes)    REFERRING PHYSICIAN: Dr. Fall    INTERVAL SINCE THERAPY: 2 years, 2 months since 9/5/2019    HISTORY: Mr. Hui is a 78 year old man with history of very high risk prostate cancer (cT2b, Grade Group 5, PSA 26.8) treated with definitive RT (completed 9/2019) + long-term ADT (last Lupron injection 11/2020).     Last seen in 6/2021 at which time he was doing well with PSA at 0.02 on 5/11/21.     Today in clinic, he is doing well. Energy improved but not quite to baseline, some increased urgency and frequency but not bothersome and not taking Flomax for it, no bowel issues, not sexually active. PSA 0.01 on 11/10/21.     REVIEW OF SYSTEMS:   Constitutional:  Denies fever or chills, or weight loss  Eyes:  Denies change in visual acuity   HENT:  Denies nasal congestion or sore throat   Respiratory:  Denies cough or shortness of breath   Cardiovascular:  Denies chest pain or edema   GI:  Denies abdominal pain, nausea, vomiting, bloody stools or diarrhea   :  As per HPI  Musculoskeletal:  Denies back pain or joint pain   Integument:  Denies rash   Neurologic:  Denies headache, focal weakness or sensory changes   Endocrine:  Denies polyuria or polydipsia   Lymphatic:  Denies swollen glands   Psychiatric:  Denies depression or anxiety     PHYSICAL EXAMINATION:   Vitals:    12/14/21 0859   BP: 113/58   Pulse: 77   Temp: 96.3 °F (35.7 °C)     GENERAL: Appears well  HEAD AND NECK: Deferred   LUNGS: Clear to auscultation.   CARDIAC: Regular rate and rhythm without murmur.   ABDOMEN: Soft, nontender, without organomegaly or masses.   GENITOURINARY: Deferred  RECTAL: Deferred  NEUROLOGIC: he is intact. There are no focal findings.      IMPRESSION: Mr. Hui is a 78 year old man with history of very high risk prostate cancer (cT2b, Grade Group 5, PSA 26.8) treated with definitive RT (completed 9/2019) + long-term ADT (last Lupron injection 11/2020).     PLAN: Biochemically controlled. Return to clinic in 6 months with PSA at that time.     TIME SPENT WITH PATIENT: 20 minutes    Paulo Evans MD   Radiation Oncology

## 2022-01-16 ENCOUNTER — OFFICE VISIT (OUTPATIENT)
Dept: URGENT CARE | Facility: CLINIC | Age: 42
End: 2022-01-16
Payer: COMMERCIAL

## 2022-01-16 VITALS
WEIGHT: 315 LBS | RESPIRATION RATE: 17 BRPM | HEART RATE: 82 BPM | HEIGHT: 72 IN | BODY MASS INDEX: 42.66 KG/M2 | OXYGEN SATURATION: 95 % | TEMPERATURE: 98 F | SYSTOLIC BLOOD PRESSURE: 143 MMHG | DIASTOLIC BLOOD PRESSURE: 86 MMHG

## 2022-01-16 DIAGNOSIS — R05.9 COUGH: ICD-10-CM

## 2022-01-16 DIAGNOSIS — J32.9 BACTERIAL SINUSITIS: Primary | ICD-10-CM

## 2022-01-16 DIAGNOSIS — B96.89 BACTERIAL SINUSITIS: Primary | ICD-10-CM

## 2022-01-16 LAB
CTP QC/QA: YES
SARS-COV-2 RDRP RESP QL NAA+PROBE: NEGATIVE

## 2022-01-16 PROCEDURE — 4010F PR ACE/ARB THEARPY RXD/TAKEN: ICD-10-PCS | Mod: CPTII,S$GLB,, | Performed by: PHYSICIAN ASSISTANT

## 2022-01-16 PROCEDURE — 99214 OFFICE O/P EST MOD 30 MIN: CPT | Mod: S$GLB,,, | Performed by: PHYSICIAN ASSISTANT

## 2022-01-16 PROCEDURE — 3077F PR MOST RECENT SYSTOLIC BLOOD PRESSURE >= 140 MM HG: ICD-10-PCS | Mod: CPTII,S$GLB,, | Performed by: PHYSICIAN ASSISTANT

## 2022-01-16 PROCEDURE — 99214 PR OFFICE/OUTPT VISIT, EST, LEVL IV, 30-39 MIN: ICD-10-PCS | Mod: S$GLB,,, | Performed by: PHYSICIAN ASSISTANT

## 2022-01-16 PROCEDURE — 3077F SYST BP >= 140 MM HG: CPT | Mod: CPTII,S$GLB,, | Performed by: PHYSICIAN ASSISTANT

## 2022-01-16 PROCEDURE — 3079F DIAST BP 80-89 MM HG: CPT | Mod: CPTII,S$GLB,, | Performed by: PHYSICIAN ASSISTANT

## 2022-01-16 PROCEDURE — 3008F BODY MASS INDEX DOCD: CPT | Mod: CPTII,S$GLB,, | Performed by: PHYSICIAN ASSISTANT

## 2022-01-16 PROCEDURE — U0002: ICD-10-PCS | Mod: QW,S$GLB,, | Performed by: PHYSICIAN ASSISTANT

## 2022-01-16 PROCEDURE — 1159F MED LIST DOCD IN RCRD: CPT | Mod: CPTII,S$GLB,, | Performed by: PHYSICIAN ASSISTANT

## 2022-01-16 PROCEDURE — 1159F PR MEDICATION LIST DOCUMENTED IN MEDICAL RECORD: ICD-10-PCS | Mod: CPTII,S$GLB,, | Performed by: PHYSICIAN ASSISTANT

## 2022-01-16 PROCEDURE — U0002 COVID-19 LAB TEST NON-CDC: HCPCS | Mod: QW,S$GLB,, | Performed by: PHYSICIAN ASSISTANT

## 2022-01-16 PROCEDURE — 3079F PR MOST RECENT DIASTOLIC BLOOD PRESSURE 80-89 MM HG: ICD-10-PCS | Mod: CPTII,S$GLB,, | Performed by: PHYSICIAN ASSISTANT

## 2022-01-16 PROCEDURE — 3008F PR BODY MASS INDEX (BMI) DOCUMENTED: ICD-10-PCS | Mod: CPTII,S$GLB,, | Performed by: PHYSICIAN ASSISTANT

## 2022-01-16 PROCEDURE — 4010F ACE/ARB THERAPY RXD/TAKEN: CPT | Mod: CPTII,S$GLB,, | Performed by: PHYSICIAN ASSISTANT

## 2022-01-16 RX ORDER — AMOXICILLIN AND CLAVULANATE POTASSIUM 875; 125 MG/1; MG/1
1 TABLET, FILM COATED ORAL EVERY 12 HOURS
Qty: 20 TABLET | Refills: 0 | Status: SHIPPED | OUTPATIENT
Start: 2022-01-16 | End: 2022-01-26

## 2022-01-16 NOTE — PROGRESS NOTES
Subjective:       Patient ID: Denny Sullivan is a 41 y.o. male.    Vitals:  height is 6' (1.829 m) and weight is 186 kg (410 lb) (abnormal). His temperature is 98.2 °F (36.8 °C). His blood pressure is 143/86 (abnormal) and his pulse is 82. His respiration is 17 and oxygen saturation is 95%.     Chief Complaint: Cough    41-year-old male who presents with 1 week history of nasal congestion, sore throat and productive cough.  Cough is producing green phlegm and is constant throughout the day and night.  Has had mild fever that has now resolved over the last 24 hours.  Sore throat is scratchy with nasal congestion that is constant.  Feels fatigued without body aches.  Has been vaccinated against COVID and had a booster.    Cough  This is a new problem. The current episode started in the past 7 days (week ago). The problem has been unchanged. The problem occurs every few hours. The cough is productive of sputum. Associated symptoms include a sore throat. Pertinent negatives include no chills, fever or postnasal drip. Nothing aggravates the symptoms. Treatments tried: hbp corti  The treatment provided mild relief.       Constitution: Negative for chills, sweating, fatigue and fever.   HENT: Positive for congestion and sore throat. Negative for postnasal drip, sinus pain and sinus pressure.    Respiratory: Positive for cough and sputum production.        Objective:      Physical Exam   Constitutional: He is oriented to person, place, and time. He appears well-developed and well-nourished. He is cooperative.  Non-toxic appearance. He does not have a sickly appearance. He does not appear ill. No distress. normalawake  HENT:   Head: Normocephalic and atraumatic.   Ears:   Right Ear: Hearing, tympanic membrane, external ear and ear canal normal.   Left Ear: Hearing, tympanic membrane, external ear and ear canal normal.   Nose: Congestion present. No mucosal edema, rhinorrhea or nasal deformity. No epistaxis. Right sinus  exhibits maxillary sinus tenderness. Right sinus exhibits no frontal sinus tenderness. Left sinus exhibits maxillary sinus tenderness. Left sinus exhibits no frontal sinus tenderness.   Mouth/Throat: Uvula is midline and mucous membranes are normal. Mucous membranes are moist. No trismus in the jaw. Normal dentition. No uvula swelling. Posterior oropharyngeal edema and posterior oropharyngeal erythema present. No oropharyngeal exudate. Tonsils are 1+ on the right. Tonsils are 2+ on the left. No tonsillar exudate.   Eyes: Conjunctivae and lids are normal. Pupils are equal, round, and reactive to light. No scleral icterus.      extraocular movement intact   Neck: Trachea normal and phonation normal. Neck supple. No edema present. No erythema present. No neck rigidity present.   Cardiovascular: Normal rate, regular rhythm, normal heart sounds, intact distal pulses and normal pulses.   Pulmonary/Chest: Effort normal and breath sounds normal. No stridor. No respiratory distress. He has no decreased breath sounds. He has no wheezes. He has no rhonchi. He has no rales.   Abdominal: Normal appearance.   Musculoskeletal: Normal range of motion.         General: No deformity or edema. Normal range of motion.   Lymphadenopathy:        Head (right side): Tonsillar adenopathy present. No submandibular, no preauricular and no posterior auricular adenopathy present.        Head (left side): Tonsillar adenopathy present. No submandibular, no preauricular and no posterior auricular adenopathy present.   Neurological: He is alert and oriented to person, place, and time. He exhibits normal muscle tone. Coordination normal.   Skin: Skin is warm, dry, intact, not diaphoretic and not pale.   Psychiatric: He has a normal mood and affect. His speech is normal and behavior is normal. Judgment and thought content normal. Cognition and memory  Nursing note and vitals reviewed.    Results for orders placed or performed in visit on 01/16/22    POCT COVID-19 Rapid Screening   Result Value Ref Range    POC Rapid COVID Negative Negative     Acceptable Yes     No results found.       Assessment:       1. Bacterial sinusitis    2. Cough          Plan:         Bacterial sinusitis  -     amoxicillin-clavulanate 875-125mg (AUGMENTIN) 875-125 mg per tablet; Take 1 tablet by mouth every 12 (twelve) hours. for 10 days  Dispense: 20 tablet; Refill: 0    Cough  -     POCT COVID-19 Rapid Screening        due to patient's elevated blood pressure will keep on Coricidin HBP scheduled, ibuprofen scheduled and antibiotic.  Follow-up with PCP          You must understand that you've received an Urgent Care treatment only and that you may be released before all your medical problems are known or treated. You, the patient, will arrange for follow up care as instructed.  Follow up with your PCP or specialty clinic as directed in the next 1-2 weeks if not improved or as needed.  You can call [phone] to schedule an appointment with the appropriate provider.  If your condition worsens we recommend that you receive another evaluation at the emergency room immediately or contact your primary medical clinics after hours call service to discuss your concerns.  Please return here or go to the Emergency Department for any concerns or worsening of condition.    If you were prescribed a narcotic or controlled medication, do not drive or operate heavy equipment or machinery while taking these medications.    Patient Instructions   Patient Education       Upper Respiratory Infection ED   General Information   You came to the Emergency Department (ED) for an upper respiratory infection or URI. A URI can affect your nose, throat, ears, and sinuses. A virus is the cause of almost all URIs and antibiotics will not help you feel better more quickly. The common cold is an example of a viral URI.  URIs are easy to spread from person to person, most often through coughing or  sneezing. A URI will almost always get better in a week or two without any treatment.  What care is needed at home?   · Call your regular doctor to let them know you were in the ED. Make a follow-up appointment if you were told to.  · If you smoke, try to quit. Your doctor or nurse can help.  · Drink lots of fluids like water, juice, or broth. This will help replace any fluids lost if you have a runny nose or fever. Warm tea or soup can help soothe a sore throat.  · If the air in your home feels dry, use a cool mist humidifier. This can help a stuffy nose and make it easier to breathe.  · You can also use saline nose drops to relieve stuffiness.  · If you decide to take over-the-counter cough or cold medicines, follow the directions on the label carefully. Be sure you do not take more than 1 medicine that contains acetaminophen. Also, if you have a heart problem or high blood pressure, check with your doctor before you take any of these medicines.  · Wash your hands often. Cough or sneeze into a tissue or your elbow instead of your hands. This will help keep others healthy.  When do I need to get emergency help?   · Return to the ED if:   ? You have trouble breathing when talking or sitting still.  When do I need to call the doctor?   · You have a fever of 100.4°F (38°C) or higher for several days, chills, a very bad sore throat, or ear or sinus pain.  · You develop a new fever after several days of feeling the same or improving.  · You develop chest pain when you cough.  · You have a cough that lasts more than 10 days.  · You cough up blood, or the color of the mucus you cough up changes.  · You have new or worsening symptoms.  Last Reviewed Date   2020-09-25  Consumer Information Use and Disclaimer   This information is not specific medical advice and does not replace information you receive from your health care provider. This is only a brief summary of general information. It does NOT include all information  about conditions, illnesses, injuries, tests, procedures, treatments, therapies, discharge instructions or life-style choices that may apply to you. You must talk with your health care provider for complete information about your health and treatment options. This information should not be used to decide whether or not to accept your health care providers advice, instructions or recommendations. Only your health care provider has the knowledge and training to provide advice that is right for you.  Copyright   Copyright © 2021 [employer] and its affiliates and/or licensors. All rights reserved.            JAD Pichardo

## 2022-01-16 NOTE — PATIENT INSTRUCTIONS
Patient Education       Upper Respiratory Infection ED   General Information   You came to the Emergency Department (ED) for an upper respiratory infection or URI. A URI can affect your nose, throat, ears, and sinuses. A virus is the cause of almost all URIs and antibiotics will not help you feel better more quickly. The common cold is an example of a viral URI.  URIs are easy to spread from person to person, most often through coughing or sneezing. A URI will almost always get better in a week or two without any treatment.  What care is needed at home?   · Call your regular doctor to let them know you were in the ED. Make a follow-up appointment if you were told to.  · If you smoke, try to quit. Your doctor or nurse can help.  · Drink lots of fluids like water, juice, or broth. This will help replace any fluids lost if you have a runny nose or fever. Warm tea or soup can help soothe a sore throat.  · If the air in your home feels dry, use a cool mist humidifier. This can help a stuffy nose and make it easier to breathe.  · You can also use saline nose drops to relieve stuffiness.  · If you decide to take over-the-counter cough or cold medicines, follow the directions on the label carefully. Be sure you do not take more than 1 medicine that contains acetaminophen. Also, if you have a heart problem or high blood pressure, check with your doctor before you take any of these medicines.  · Wash your hands often. Cough or sneeze into a tissue or your elbow instead of your hands. This will help keep others healthy.  When do I need to get emergency help?   · Return to the ED if:   ? You have trouble breathing when talking or sitting still.  When do I need to call the doctor?   · You have a fever of 100.4°F (38°C) or higher for several days, chills, a very bad sore throat, or ear or sinus pain.  · You develop a new fever after several days of feeling the same or improving.  · You develop chest pain when you cough.  · You  have a cough that lasts more than 10 days.  · You cough up blood, or the color of the mucus you cough up changes.  · You have new or worsening symptoms.  Last Reviewed Date   2020-09-25  Consumer Information Use and Disclaimer   This information is not specific medical advice and does not replace information you receive from your health care provider. This is only a brief summary of general information. It does NOT include all information about conditions, illnesses, injuries, tests, procedures, treatments, therapies, discharge instructions or life-style choices that may apply to you. You must talk with your health care provider for complete information about your health and treatment options. This information should not be used to decide whether or not to accept your health care providers advice, instructions or recommendations. Only your health care provider has the knowledge and training to provide advice that is right for you.  Copyright   Copyright © 2021 [employer] and its affiliates and/or licensors. All rights reserved.

## 2022-08-22 PROBLEM — J01.00 ACUTE NON-RECURRENT MAXILLARY SINUSITIS: Status: ACTIVE | Noted: 2022-08-22

## 2022-10-19 ENCOUNTER — OFFICE VISIT (OUTPATIENT)
Dept: URGENT CARE | Facility: CLINIC | Age: 42
End: 2022-10-19
Payer: COMMERCIAL

## 2022-10-19 VITALS
RESPIRATION RATE: 16 BRPM | OXYGEN SATURATION: 95 % | BODY MASS INDEX: 41.75 KG/M2 | TEMPERATURE: 98 F | HEIGHT: 73 IN | WEIGHT: 315 LBS | SYSTOLIC BLOOD PRESSURE: 139 MMHG | DIASTOLIC BLOOD PRESSURE: 85 MMHG | HEART RATE: 69 BPM

## 2022-10-19 DIAGNOSIS — R05.9 COUGH, UNSPECIFIED TYPE: Primary | ICD-10-CM

## 2022-10-19 DIAGNOSIS — R09.82 POST-NASAL DRAINAGE: ICD-10-CM

## 2022-10-19 DIAGNOSIS — J02.9 SORE THROAT: ICD-10-CM

## 2022-10-19 LAB
CTP QC/QA: YES
SARS-COV-2 RDRP RESP QL NAA+PROBE: NEGATIVE

## 2022-10-19 PROCEDURE — 4010F PR ACE/ARB THEARPY RXD/TAKEN: ICD-10-PCS | Mod: CPTII,S$GLB,, | Performed by: NURSE PRACTITIONER

## 2022-10-19 PROCEDURE — 3075F SYST BP GE 130 - 139MM HG: CPT | Mod: CPTII,S$GLB,, | Performed by: NURSE PRACTITIONER

## 2022-10-19 PROCEDURE — 3044F HG A1C LEVEL LT 7.0%: CPT | Mod: CPTII,S$GLB,, | Performed by: NURSE PRACTITIONER

## 2022-10-19 PROCEDURE — 3066F PR DOCUMENTATION OF TREATMENT FOR NEPHROPATHY: ICD-10-PCS | Mod: CPTII,S$GLB,, | Performed by: NURSE PRACTITIONER

## 2022-10-19 PROCEDURE — 1159F MED LIST DOCD IN RCRD: CPT | Mod: CPTII,S$GLB,, | Performed by: NURSE PRACTITIONER

## 2022-10-19 PROCEDURE — 3066F NEPHROPATHY DOC TX: CPT | Mod: CPTII,S$GLB,, | Performed by: NURSE PRACTITIONER

## 2022-10-19 PROCEDURE — 3061F NEG MICROALBUMINURIA REV: CPT | Mod: CPTII,S$GLB,, | Performed by: NURSE PRACTITIONER

## 2022-10-19 PROCEDURE — 87635 SARS-COV-2 COVID-19 AMP PRB: CPT | Mod: QW,S$GLB,, | Performed by: NURSE PRACTITIONER

## 2022-10-19 PROCEDURE — 1159F PR MEDICATION LIST DOCUMENTED IN MEDICAL RECORD: ICD-10-PCS | Mod: CPTII,S$GLB,, | Performed by: NURSE PRACTITIONER

## 2022-10-19 PROCEDURE — 3079F DIAST BP 80-89 MM HG: CPT | Mod: CPTII,S$GLB,, | Performed by: NURSE PRACTITIONER

## 2022-10-19 PROCEDURE — 3044F PR MOST RECENT HEMOGLOBIN A1C LEVEL <7.0%: ICD-10-PCS | Mod: CPTII,S$GLB,, | Performed by: NURSE PRACTITIONER

## 2022-10-19 PROCEDURE — 3061F PR NEG MICROALBUMINURIA RESULT DOCUMENTED/REVIEW: ICD-10-PCS | Mod: CPTII,S$GLB,, | Performed by: NURSE PRACTITIONER

## 2022-10-19 PROCEDURE — 3075F PR MOST RECENT SYSTOLIC BLOOD PRESS GE 130-139MM HG: ICD-10-PCS | Mod: CPTII,S$GLB,, | Performed by: NURSE PRACTITIONER

## 2022-10-19 PROCEDURE — 3079F PR MOST RECENT DIASTOLIC BLOOD PRESSURE 80-89 MM HG: ICD-10-PCS | Mod: CPTII,S$GLB,, | Performed by: NURSE PRACTITIONER

## 2022-10-19 PROCEDURE — 99213 OFFICE O/P EST LOW 20 MIN: CPT | Mod: S$GLB,,, | Performed by: NURSE PRACTITIONER

## 2022-10-19 PROCEDURE — 87635: ICD-10-PCS | Mod: QW,S$GLB,, | Performed by: NURSE PRACTITIONER

## 2022-10-19 PROCEDURE — 4010F ACE/ARB THERAPY RXD/TAKEN: CPT | Mod: CPTII,S$GLB,, | Performed by: NURSE PRACTITIONER

## 2022-10-19 PROCEDURE — 99213 PR OFFICE/OUTPT VISIT, EST, LEVL III, 20-29 MIN: ICD-10-PCS | Mod: S$GLB,,, | Performed by: NURSE PRACTITIONER

## 2022-10-19 NOTE — PATIENT INSTRUCTIONS
"General Discharge Instructions   If you were prescribed a narcotic or controlled medication, do not drive or operate heavy equipment or machinery while taking these medications.  If you were prescribed antibiotics, please take them to completion.  You must understand that you've received an Urgent Care treatment only and that you may be released before all your medical problems are known or treated. You, the patient, will arrange for follow up care as instructed.  Follow up with your PCP or specialty clinic as directed in the next 1-2 weeks if not improved or as needed.  You can call [phone] to schedule an appointment with the appropriate provider.  If your condition worsens we recommend that you receive another evaluation at the emergency room immediately or contact your primary medical clinics after hours call service to discuss your concerns.  Please return here or go to the Emergency Department for any concerns or worsening of condition.      WE CANNOT RULE OUT ALL POSSIBLE CAUSES OF YOUR SYMPTOMS IN THE URGENT CARE SETTING PLEASE GO TO THE ER IF YOU FEELS YOUR CONDITION IS WORSENING OR YOU WOULD LIKE EMERGENT EVALUATION.                                                              URI   If your condition worsens or fails to improve we recommend that you receive another evaluation at the ER immediately or contact your PCP to discuss your concerns or return here. You must understand that you've received an urgent care treatment only and that you may be released before all your medical problems are known or treated. You the patient will arrange for follouwp care as instructed.   If we discussed that I think your illness is viral, it will not respond to antibiotics and will last 5-7 days. If we discussed "wait and see" antibiotics and if over the next few days the symptoms worsen start the antibiotics I have given you.   -  If you are female and on BCP and do take the antibiotics, use additional methods to " "prevent pregnancy while on the antibiotics and for one cycle after.   -  Flonase (fluticasone) is a nasal spray which is available over the counter and may help with your symptoms.   -  Zyrtec D, Claritin D or Allegra D can also help with symptoms of congestion and drainage.   -  If you have hypertension avoid using the "D" which is the decongestant.  Instead you can use Coricidin HBP for cold and cough symptoms.    -  If you just have drainage you can take plain Zyrtec, Claritin or Allegra   -  If you just have a congested feeling you can take pseudoephedrine (unless you have high blood pressure) which you have to sign for behind the counter. Do not buy the phenylephrine which is on the shelf as it is not effective   -  Rest and fluids are also important.   -  Tylenol or ibuprofen can also be used as directed for pain unless you have an allergy to them or medical condition such as stomach ulcers, kidney or liver disease or blood thinners etc for which you should not be taking these type of medications.   -  If you are flying in the next few days Afrin nose drops for the airplane flight upon take off and landing may help. Other than at those times refrain from using afrin.   - If you were prescribed a narcotic do not drive or operate heavy machinery while taking these medications.        "

## 2022-10-19 NOTE — PROGRESS NOTES
"Subjective:       Patient ID: Denny Sullivan is a 41 y.o. male.    Vitals:  height is 6' 1" (1.854 m) and weight is 158.8 kg (350 lb) (abnormal). His temperature is 97.5 °F (36.4 °C). His blood pressure is 139/85 and his pulse is 69. His respiration is 16 and oxygen saturation is 95%.     Chief Complaint: Sore Throat    This is a 41 y.o. male who presents today with a chief complaint of sore throat, runny nose, and cough x 7 days ago. Pt symptoms have not gotten any better and believes he has a sinus infection.       Sore Throat   This is a new problem. Episode onset: 7 days ago. The problem has been unchanged. Neither side of throat is experiencing more pain than the other. There has been no fever. The pain is at a severity of 3/10. The pain is mild. Associated symptoms include coughing. Pertinent negatives include no congestion, headaches, hoarse voice or neck pain. Associated symptoms comments: Runny nose. He has had no exposure to strep or mono. He has tried nothing for the symptoms.   HENT:  Positive for sore throat. Negative for congestion.    Neck: Negative for neck pain.   Respiratory:  Positive for cough.    Neurological:  Negative for headaches.     Objective:      Physical Exam   HENT:   Head: Normocephalic and atraumatic.   Ears:   Right Ear: Tympanic membrane, external ear and ear canal normal.   Left Ear: Tympanic membrane, external ear and ear canal normal.   Nose: Nose normal.   Mouth/Throat: Uvula is midline. Mucous membranes are moist. Posterior oropharyngeal erythema present. Oropharynx is clear.   Post nasal drip      Comments: Post nasal drip  Pulmonary/Chest: Effort normal and breath sounds normal.   Abdominal: Normal appearance.   Neurological: He is alert.   Skin: Skin is warm.   Nursing note and vitals reviewed.      Results for orders placed or performed in visit on 10/19/22   POCT COVID-19 Rapid Screening   Result Value Ref Range    POC Rapid COVID Negative Negative     " Acceptable Yes       Assessment:       1. Cough, unspecified type    2. Post-nasal drainage    3. Sore throat          Plan:     Covid negative     Cough, unspecified type  -     POCT COVID-19 Rapid Screening    Post-nasal drainage    Sore throat               Patient Instructions   General Discharge Instructions   If you were prescribed a narcotic or controlled medication, do not drive or operate heavy equipment or machinery while taking these medications.  If you were prescribed antibiotics, please take them to completion.  You must understand that you've received an Urgent Care treatment only and that you may be released before all your medical problems are known or treated. You, the patient, will arrange for follow up care as instructed.  Follow up with your PCP or specialty clinic as directed in the next 1-2 weeks if not improved or as needed.  You can call [phone] to schedule an appointment with the appropriate provider.  If your condition worsens we recommend that you receive another evaluation at the emergency room immediately or contact your primary medical clinics after hours call service to discuss your concerns.  Please return here or go to the Emergency Department for any concerns or worsening of condition.      WE CANNOT RULE OUT ALL POSSIBLE CAUSES OF YOUR SYMPTOMS IN THE URGENT CARE SETTING PLEASE GO TO THE ER IF YOU FEELS YOUR CONDITION IS WORSENING OR YOU WOULD LIKE EMERGENT EVALUATION.                                                              URI   If your condition worsens or fails to improve we recommend that you receive another evaluation at the ER immediately or contact your PCP to discuss your concerns or return here. You must understand that you've received an urgent care treatment only and that you may be released before all your medical problems are known or treated. You the patient will arrange for follouwp care as instructed.   If we discussed that I think your illness is  "viral, it will not respond to antibiotics and will last 5-7 days. If we discussed "wait and see" antibiotics and if over the next few days the symptoms worsen start the antibiotics I have given you.   -  If you are female and on BCP and do take the antibiotics, use additional methods to prevent pregnancy while on the antibiotics and for one cycle after.   -  Flonase (fluticasone) is a nasal spray which is available over the counter and may help with your symptoms.   -  Zyrtec D, Claritin D or Allegra D can also help with symptoms of congestion and drainage.   -  If you have hypertension avoid using the "D" which is the decongestant.  Instead you can use Coricidin HBP for cold and cough symptoms.    -  If you just have drainage you can take plain Zyrtec, Claritin or Allegra   -  If you just have a congested feeling you can take pseudoephedrine (unless you have high blood pressure) which you have to sign for behind the counter. Do not buy the phenylephrine which is on the shelf as it is not effective   -  Rest and fluids are also important.   -  Tylenol or ibuprofen can also be used as directed for pain unless you have an allergy to them or medical condition such as stomach ulcers, kidney or liver disease or blood thinners etc for which you should not be taking these type of medications.   -  If you are flying in the next few days Afrin nose drops for the airplane flight upon take off and landing may help. Other than at those times refrain from using afrin.   - If you were prescribed a narcotic do not drive or operate heavy machinery while taking these medications.           "

## 2022-11-21 PROBLEM — J01.00 ACUTE NON-RECURRENT MAXILLARY SINUSITIS: Status: RESOLVED | Noted: 2022-08-22 | Resolved: 2022-11-21

## 2023-06-15 PROBLEM — J01.00 ACUTE NON-RECURRENT MAXILLARY SINUSITIS: Status: ACTIVE | Noted: 2023-06-15

## 2023-06-26 PROBLEM — Z20.822 CONTACT WITH AND (SUSPECTED) EXPOSURE TO COVID-19: Status: RESOLVED | Noted: 2021-08-05 | Resolved: 2023-06-26

## 2023-09-18 PROBLEM — J01.00 ACUTE NON-RECURRENT MAXILLARY SINUSITIS: Status: RESOLVED | Noted: 2023-06-15 | Resolved: 2023-09-18

## (undated) DEVICE — PACK PERI/GYN OPTIMA

## (undated) DEVICE — SEE MEDLINE ITEM 157148

## (undated) DEVICE — SEE MEDLINE ITEM 157181

## (undated) DEVICE — LEGGING CLEAR POLY 2/PACK

## (undated) DEVICE — ELECTRODE REM PLYHSV RETURN 9

## (undated) DEVICE — SEE MEDLINE ITEM 146417

## (undated) DEVICE — PAD ABDOMINAL 5X9 STERILE

## (undated) DEVICE — TRAY MINOR GEN SURG

## (undated) DEVICE — GAUZE SPONGE 4X4 12PLY

## (undated) DEVICE — Device

## (undated) DEVICE — NDL 22GA X1 1/2 REG BEVEL

## (undated) DEVICE — SYR ONLY LUER LOCK 20CC